# Patient Record
Sex: MALE | Race: WHITE | NOT HISPANIC OR LATINO | Employment: UNEMPLOYED | URBAN - METROPOLITAN AREA
[De-identification: names, ages, dates, MRNs, and addresses within clinical notes are randomized per-mention and may not be internally consistent; named-entity substitution may affect disease eponyms.]

---

## 2019-06-01 ENCOUNTER — APPOINTMENT (OUTPATIENT)
Dept: RADIOLOGY | Facility: MEDICAL CENTER | Age: 24
DRG: 199 | End: 2019-06-01
Attending: SURGERY
Payer: MEDICAID

## 2019-06-01 ENCOUNTER — APPOINTMENT (OUTPATIENT)
Dept: RADIOLOGY | Facility: MEDICAL CENTER | Age: 24
DRG: 199 | End: 2019-06-01
Attending: EMERGENCY MEDICINE
Payer: MEDICAID

## 2019-06-01 ENCOUNTER — HOSPITAL ENCOUNTER (INPATIENT)
Facility: MEDICAL CENTER | Age: 24
LOS: 6 days | DRG: 199 | End: 2019-06-07
Attending: EMERGENCY MEDICINE | Admitting: SURGERY
Payer: MEDICAID

## 2019-06-01 DIAGNOSIS — T14.90XA TRAUMA: ICD-10-CM

## 2019-06-01 DIAGNOSIS — T07.XXXA MULTIPLE STAB WOUNDS: ICD-10-CM

## 2019-06-01 DIAGNOSIS — J93.9 PNEUMOTHORAX, UNSPECIFIED TYPE: ICD-10-CM

## 2019-06-01 DIAGNOSIS — T14.8XXA STAB WOUND: ICD-10-CM

## 2019-06-01 DIAGNOSIS — J94.2 HEMOPNEUMOTHORAX ON LEFT: ICD-10-CM

## 2019-06-01 PROBLEM — Z78.9 NO CONTRAINDICATION TO DEEP VEIN THROMBOSIS (DVT) PROPHYLAXIS: Status: ACTIVE | Noted: 2019-06-01

## 2019-06-01 LAB
ABO + RH BLD: NORMAL
ABO GROUP BLD: NORMAL
ALBUMIN SERPL BCP-MCNC: 3.7 G/DL (ref 3.2–4.9)
ALBUMIN/GLOB SERPL: 1.7 G/DL
ALP SERPL-CCNC: 40 U/L (ref 30–99)
ALT SERPL-CCNC: 45 U/L (ref 2–50)
ANION GAP SERPL CALC-SCNC: 9 MMOL/L (ref 0–11.9)
APTT PPP: 26.8 SEC (ref 24.7–36)
AST SERPL-CCNC: 29 U/L (ref 12–45)
BARCODED ABORH UBTYP: 5100
BARCODED ABORH UBTYP: 600
BARCODED ABORH UBTYP: 6200
BARCODED PRD CODE UBPRD: NORMAL
BARCODED UNIT NUM UBUNT: NORMAL
BASOPHILS # BLD AUTO: 0.3 % (ref 0–1.8)
BASOPHILS # BLD: 0.05 K/UL (ref 0–0.12)
BILIRUB SERPL-MCNC: 0.6 MG/DL (ref 0.1–1.5)
BLD GP AB SCN SERPL QL: NORMAL
BUN SERPL-MCNC: 8 MG/DL (ref 8–22)
CALCIUM SERPL-MCNC: 7.6 MG/DL (ref 8.5–10.5)
CFT BLD TEG: 3.2 MIN (ref 5–10)
CHLORIDE SERPL-SCNC: 108 MMOL/L (ref 96–112)
CLOT ANGLE BLD TEG: 68.4 DEGREES (ref 53–72)
CLOT LYSIS 30M P MA LENFR BLD TEG: 0.2 % (ref 0–8)
CO2 SERPL-SCNC: 21 MMOL/L (ref 20–33)
COMPONENT F 8504F: NORMAL
COMPONENT P 8504P: NORMAL
COMPONENT R 8504R: NORMAL
CREAT SERPL-MCNC: 0.77 MG/DL (ref 0.5–1.4)
CT.EXTRINSIC BLD ROTEM: 1.6 MIN (ref 1–3)
EOSINOPHIL # BLD AUTO: 0.01 K/UL (ref 0–0.51)
EOSINOPHIL NFR BLD: 0.1 % (ref 0–6.9)
ERYTHROCYTE [DISTWIDTH] IN BLOOD BY AUTOMATED COUNT: 40.6 FL (ref 35.9–50)
ERYTHROCYTE [DISTWIDTH] IN BLOOD BY AUTOMATED COUNT: 41.7 FL (ref 35.9–50)
ETHANOL BLD-MCNC: 0 G/DL
GLOBULIN SER CALC-MCNC: 2.2 G/DL (ref 1.9–3.5)
GLUCOSE SERPL-MCNC: 155 MG/DL (ref 65–99)
HCT VFR BLD AUTO: 33.4 % (ref 42–52)
HCT VFR BLD AUTO: 37.4 % (ref 42–52)
HGB BLD-MCNC: 11.3 G/DL (ref 14–18)
HGB BLD-MCNC: 12.4 G/DL (ref 14–18)
IMM GRANULOCYTES # BLD AUTO: 0.05 K/UL (ref 0–0.11)
IMM GRANULOCYTES NFR BLD AUTO: 0.3 % (ref 0–0.9)
INR PPP: 1.11 (ref 0.87–1.13)
LACTATE BLD-SCNC: 1.5 MMOL/L (ref 0.5–2)
LYMPHOCYTES # BLD AUTO: 1.18 K/UL (ref 1–4.8)
LYMPHOCYTES NFR BLD: 6.4 % (ref 22–41)
MAGNESIUM SERPL-MCNC: 1.5 MG/DL (ref 1.5–2.5)
MCF BLD TEG: 65.3 MM (ref 50–70)
MCH RBC QN AUTO: 30.6 PG (ref 27–33)
MCH RBC QN AUTO: 30.8 PG (ref 27–33)
MCHC RBC AUTO-ENTMCNC: 33.2 G/DL (ref 33.7–35.3)
MCHC RBC AUTO-ENTMCNC: 33.8 G/DL (ref 33.7–35.3)
MCV RBC AUTO: 90.5 FL (ref 81.4–97.8)
MCV RBC AUTO: 92.8 FL (ref 81.4–97.8)
MONOCYTES # BLD AUTO: 1.02 K/UL (ref 0–0.85)
MONOCYTES NFR BLD AUTO: 5.6 % (ref 0–13.4)
NEUTROPHILS # BLD AUTO: 16 K/UL (ref 1.82–7.42)
NEUTROPHILS NFR BLD: 87.3 % (ref 44–72)
NRBC # BLD AUTO: 0 K/UL
NRBC BLD-RTO: 0 /100 WBC
PA AA BLD-ACNC: 0 %
PA ADP BLD-ACNC: 31.5 %
PHOSPHATE SERPL-MCNC: 4 MG/DL (ref 2.5–4.5)
PLATELET # BLD AUTO: 250 K/UL (ref 164–446)
PLATELET # BLD AUTO: 293 K/UL (ref 164–446)
PMV BLD AUTO: 10.7 FL (ref 9–12.9)
PMV BLD AUTO: 10.7 FL (ref 9–12.9)
POTASSIUM SERPL-SCNC: 3.5 MMOL/L (ref 3.6–5.5)
PRODUCT TYPE UPROD: NORMAL
PROT SERPL-MCNC: 5.9 G/DL (ref 6–8.2)
PROTHROMBIN TIME: 14.6 SEC (ref 12–14.6)
RBC # BLD AUTO: 3.69 M/UL (ref 4.7–6.1)
RBC # BLD AUTO: 4.03 M/UL (ref 4.7–6.1)
RH BLD: NORMAL
SODIUM SERPL-SCNC: 138 MMOL/L (ref 135–145)
TEG ALGORITHM TGALG: ABNORMAL
UNIT STATUS USTAT: NORMAL
WBC # BLD AUTO: 18.3 K/UL (ref 4.8–10.8)
WBC # BLD AUTO: 9.7 K/UL (ref 4.8–10.8)

## 2019-06-01 PROCEDURE — 96375 TX/PRO/DX INJ NEW DRUG ADDON: CPT

## 2019-06-01 PROCEDURE — 80307 DRUG TEST PRSMV CHEM ANLYZR: CPT

## 2019-06-01 PROCEDURE — 700101 HCHG RX REV CODE 250: Performed by: SURGERY

## 2019-06-01 PROCEDURE — 85027 COMPLETE CBC AUTOMATED: CPT

## 2019-06-01 PROCEDURE — 36415 COLL VENOUS BLD VENIPUNCTURE: CPT

## 2019-06-01 PROCEDURE — 302128 INFUSION PUMP: Performed by: SURGERY

## 2019-06-01 PROCEDURE — 302131 K PAD MOTOR: Performed by: SURGERY

## 2019-06-01 PROCEDURE — 83735 ASSAY OF MAGNESIUM: CPT

## 2019-06-01 PROCEDURE — 80053 COMPREHEN METABOLIC PANEL: CPT

## 2019-06-01 PROCEDURE — 71045 X-RAY EXAM CHEST 1 VIEW: CPT

## 2019-06-01 PROCEDURE — 700117 HCHG RX CONTRAST REV CODE 255: Performed by: EMERGENCY MEDICINE

## 2019-06-01 PROCEDURE — 86850 RBC ANTIBODY SCREEN: CPT

## 2019-06-01 PROCEDURE — 0HQ6XZZ REPAIR BACK SKIN, EXTERNAL APPROACH: ICD-10-PCS | Performed by: SURGERY

## 2019-06-01 PROCEDURE — 700111 HCHG RX REV CODE 636 W/ 250 OVERRIDE (IP)

## 2019-06-01 PROCEDURE — 71260 CT THORAX DX C+: CPT

## 2019-06-01 PROCEDURE — 700101 HCHG RX REV CODE 250

## 2019-06-01 PROCEDURE — 0W9B30Z DRAINAGE OF LEFT PLEURAL CAVITY WITH DRAINAGE DEVICE, PERCUTANEOUS APPROACH: ICD-10-PCS | Performed by: INTERNAL MEDICINE

## 2019-06-01 PROCEDURE — 0HQGXZZ REPAIR LEFT HAND SKIN, EXTERNAL APPROACH: ICD-10-PCS | Performed by: SURGERY

## 2019-06-01 PROCEDURE — 96365 THER/PROPH/DIAG IV INF INIT: CPT

## 2019-06-01 PROCEDURE — 700111 HCHG RX REV CODE 636 W/ 250 OVERRIDE (IP): Performed by: SURGERY

## 2019-06-01 PROCEDURE — 99285 EMERGENCY DEPT VISIT HI MDM: CPT

## 2019-06-01 PROCEDURE — 770006 HCHG ROOM/CARE - MED/SURG/GYN SEMI*

## 2019-06-01 PROCEDURE — 94668 MNPJ CHEST WALL SBSQ: CPT

## 2019-06-01 PROCEDURE — 700105 HCHG RX REV CODE 258: Performed by: SURGERY

## 2019-06-01 PROCEDURE — 32551 INSERTION OF CHEST TUBE: CPT

## 2019-06-01 PROCEDURE — 302220 CHEST TUBE TRAY: Performed by: SURGERY

## 2019-06-01 PROCEDURE — 700112 HCHG RX REV CODE 229: Performed by: SURGERY

## 2019-06-01 PROCEDURE — 94760 N-INVAS EAR/PLS OXIMETRY 1: CPT

## 2019-06-01 PROCEDURE — 84100 ASSAY OF PHOSPHORUS: CPT

## 2019-06-01 PROCEDURE — 700102 HCHG RX REV CODE 250 W/ 637 OVERRIDE(OP): Performed by: SURGERY

## 2019-06-01 PROCEDURE — 94667 MNPJ CHEST WALL 1ST: CPT

## 2019-06-01 PROCEDURE — 0HQBXZZ REPAIR RIGHT UPPER ARM SKIN, EXTERNAL APPROACH: ICD-10-PCS | Performed by: SURGERY

## 2019-06-01 PROCEDURE — 304217 HCHG IRRIGATION SYSTEM

## 2019-06-01 PROCEDURE — 85730 THROMBOPLASTIN TIME PARTIAL: CPT

## 2019-06-01 PROCEDURE — 86901 BLOOD TYPING SEROLOGIC RH(D): CPT

## 2019-06-01 PROCEDURE — 305308 HCHG STAPLER,SKIN,DISP.

## 2019-06-01 PROCEDURE — A9270 NON-COVERED ITEM OR SERVICE: HCPCS | Performed by: SURGERY

## 2019-06-01 PROCEDURE — 83605 ASSAY OF LACTIC ACID: CPT

## 2019-06-01 PROCEDURE — 85025 COMPLETE CBC W/AUTO DIFF WBC: CPT

## 2019-06-01 PROCEDURE — 305949 HCHG RED TRAUMA ACT PRE-NOTIFY NO CC

## 2019-06-01 PROCEDURE — 0HQHXZZ REPAIR RIGHT UPPER LEG SKIN, EXTERNAL APPROACH: ICD-10-PCS | Performed by: SURGERY

## 2019-06-01 PROCEDURE — 85347 COAGULATION TIME ACTIVATED: CPT

## 2019-06-01 PROCEDURE — 304999 HCHG REPAIR-SIMPLE/INTERMED LEVEL 1

## 2019-06-01 PROCEDURE — 85384 FIBRINOGEN ACTIVITY: CPT

## 2019-06-01 PROCEDURE — 85610 PROTHROMBIN TIME: CPT

## 2019-06-01 PROCEDURE — 86900 BLOOD TYPING SEROLOGIC ABO: CPT

## 2019-06-01 PROCEDURE — 0HQ5XZZ REPAIR CHEST SKIN, EXTERNAL APPROACH: ICD-10-PCS | Performed by: SURGERY

## 2019-06-01 PROCEDURE — 303747 HCHG EXTRA SUTURE

## 2019-06-01 PROCEDURE — 85576 BLOOD PLATELET AGGREGATION: CPT | Mod: 91

## 2019-06-01 PROCEDURE — 302151 K-PAD 14X20: Performed by: SURGERY

## 2019-06-01 RX ORDER — ONDANSETRON 2 MG/ML
4 INJECTION INTRAMUSCULAR; INTRAVENOUS EVERY 4 HOURS PRN
Status: DISCONTINUED | OUTPATIENT
Start: 2019-06-01 | End: 2019-06-07 | Stop reason: HOSPADM

## 2019-06-01 RX ORDER — ENEMA 19; 7 G/133ML; G/133ML
1 ENEMA RECTAL
Status: DISCONTINUED | OUTPATIENT
Start: 2019-06-01 | End: 2019-06-07 | Stop reason: HOSPADM

## 2019-06-01 RX ORDER — AMOXICILLIN 250 MG
1 CAPSULE ORAL
Status: DISCONTINUED | OUTPATIENT
Start: 2019-06-01 | End: 2019-06-07 | Stop reason: HOSPADM

## 2019-06-01 RX ORDER — HYDROMORPHONE HYDROCHLORIDE 1 MG/ML
0.5 INJECTION, SOLUTION INTRAMUSCULAR; INTRAVENOUS; SUBCUTANEOUS
Status: DISCONTINUED | OUTPATIENT
Start: 2019-06-01 | End: 2019-06-07 | Stop reason: HOSPADM

## 2019-06-01 RX ORDER — CEFAZOLIN SODIUM 1 G/50ML
2 INJECTION, SOLUTION INTRAVENOUS ONCE
Status: COMPLETED | OUTPATIENT
Start: 2019-06-01 | End: 2019-06-01

## 2019-06-01 RX ORDER — FAMOTIDINE 20 MG/1
20 TABLET, FILM COATED ORAL 2 TIMES DAILY
Status: DISCONTINUED | OUTPATIENT
Start: 2019-06-01 | End: 2019-06-07 | Stop reason: HOSPADM

## 2019-06-01 RX ORDER — LIDOCAINE HYDROCHLORIDE 10 MG/ML
20 INJECTION, SOLUTION INFILTRATION; PERINEURAL ONCE
Status: COMPLETED | OUTPATIENT
Start: 2019-06-01 | End: 2019-06-01

## 2019-06-01 RX ORDER — BACITRACIN ZINC AND POLYMYXIN B SULFATE 500; 1000 [USP'U]/G; [USP'U]/G
OINTMENT TOPICAL 3 TIMES DAILY
Status: DISCONTINUED | OUTPATIENT
Start: 2019-06-01 | End: 2019-06-07 | Stop reason: HOSPADM

## 2019-06-01 RX ORDER — DOCUSATE SODIUM 100 MG/1
100 CAPSULE, LIQUID FILLED ORAL 2 TIMES DAILY
Status: DISCONTINUED | OUTPATIENT
Start: 2019-06-01 | End: 2019-06-07 | Stop reason: HOSPADM

## 2019-06-01 RX ORDER — CELECOXIB 200 MG/1
200 CAPSULE ORAL 2 TIMES DAILY
Status: COMPLETED | OUTPATIENT
Start: 2019-06-01 | End: 2019-06-05

## 2019-06-01 RX ORDER — MIDAZOLAM HYDROCHLORIDE 1 MG/ML
1-5 INJECTION INTRAMUSCULAR; INTRAVENOUS ONCE
Status: COMPLETED | OUTPATIENT
Start: 2019-06-01 | End: 2019-06-01

## 2019-06-01 RX ORDER — BISACODYL 10 MG
10 SUPPOSITORY, RECTAL RECTAL
Status: DISCONTINUED | OUTPATIENT
Start: 2019-06-01 | End: 2019-06-07 | Stop reason: HOSPADM

## 2019-06-01 RX ORDER — SODIUM CHLORIDE, SODIUM LACTATE, POTASSIUM CHLORIDE, CALCIUM CHLORIDE 600; 310; 30; 20 MG/100ML; MG/100ML; MG/100ML; MG/100ML
INJECTION, SOLUTION INTRAVENOUS CONTINUOUS
Status: DISCONTINUED | OUTPATIENT
Start: 2019-06-01 | End: 2019-06-03

## 2019-06-01 RX ORDER — OXYCODONE HYDROCHLORIDE 5 MG/1
5 TABLET ORAL
Status: DISCONTINUED | OUTPATIENT
Start: 2019-06-01 | End: 2019-06-07 | Stop reason: HOSPADM

## 2019-06-01 RX ORDER — POLYETHYLENE GLYCOL 3350 17 G/17G
1 POWDER, FOR SOLUTION ORAL 2 TIMES DAILY
Status: DISCONTINUED | OUTPATIENT
Start: 2019-06-01 | End: 2019-06-07 | Stop reason: HOSPADM

## 2019-06-01 RX ORDER — LIDOCAINE HYDROCHLORIDE 10 MG/ML
INJECTION, SOLUTION INFILTRATION; PERINEURAL
Status: COMPLETED
Start: 2019-06-01 | End: 2019-06-01

## 2019-06-01 RX ORDER — AMOXICILLIN 250 MG
1 CAPSULE ORAL NIGHTLY
Status: DISCONTINUED | OUTPATIENT
Start: 2019-06-01 | End: 2019-06-07 | Stop reason: HOSPADM

## 2019-06-01 RX ORDER — ACETAMINOPHEN 325 MG/1
650 TABLET ORAL EVERY 6 HOURS
Status: DISPENSED | OUTPATIENT
Start: 2019-06-01 | End: 2019-06-05

## 2019-06-01 RX ADMIN — OXYCODONE HYDROCHLORIDE 5 MG: 5 TABLET ORAL at 22:05

## 2019-06-01 RX ADMIN — OXYCODONE HYDROCHLORIDE 5 MG: 5 TABLET ORAL at 11:22

## 2019-06-01 RX ADMIN — CEFAZOLIN SODIUM 1 G: 1 INJECTION, SOLUTION INTRAVENOUS at 01:15

## 2019-06-01 RX ADMIN — OXYCODONE HYDROCHLORIDE 5 MG: 5 TABLET ORAL at 07:21

## 2019-06-01 RX ADMIN — MIDAZOLAM HYDROCHLORIDE 5 MG: 1 INJECTION, SOLUTION INTRAMUSCULAR; INTRAVENOUS at 00:59

## 2019-06-01 RX ADMIN — ACETAMINOPHEN 650 MG: 325 TABLET, FILM COATED ORAL at 03:45

## 2019-06-01 RX ADMIN — OXYCODONE HYDROCHLORIDE 5 MG: 5 TABLET ORAL at 18:50

## 2019-06-01 RX ADMIN — ACETAMINOPHEN 650 MG: 325 TABLET, FILM COATED ORAL at 22:05

## 2019-06-01 RX ADMIN — FENTANYL CITRATE 100 MCG: 50 INJECTION INTRAMUSCULAR; INTRAVENOUS at 00:59

## 2019-06-01 RX ADMIN — HYDROMORPHONE HYDROCHLORIDE 0.5 MG: 1 INJECTION, SOLUTION INTRAMUSCULAR; INTRAVENOUS; SUBCUTANEOUS at 09:30

## 2019-06-01 RX ADMIN — HYDROMORPHONE HYDROCHLORIDE 0.5 MG: 1 INJECTION, SOLUTION INTRAMUSCULAR; INTRAVENOUS; SUBCUTANEOUS at 19:31

## 2019-06-01 RX ADMIN — LIDOCAINE HYDROCHLORIDE 20 ML: 10 INJECTION, SOLUTION INFILTRATION; PERINEURAL at 01:45

## 2019-06-01 RX ADMIN — CELECOXIB 200 MG: 200 CAPSULE ORAL at 03:44

## 2019-06-01 RX ADMIN — FAMOTIDINE 20 MG: 20 TABLET ORAL at 17:14

## 2019-06-01 RX ADMIN — Medication 1 EACH: at 03:49

## 2019-06-01 RX ADMIN — OXYCODONE HYDROCHLORIDE 5 MG: 5 TABLET ORAL at 15:31

## 2019-06-01 RX ADMIN — Medication 1 EACH: at 11:22

## 2019-06-01 RX ADMIN — SODIUM CHLORIDE, POTASSIUM CHLORIDE, SODIUM LACTATE AND CALCIUM CHLORIDE: 600; 310; 30; 20 INJECTION, SOLUTION INTRAVENOUS at 03:43

## 2019-06-01 RX ADMIN — SODIUM CHLORIDE, POTASSIUM CHLORIDE, SODIUM LACTATE AND CALCIUM CHLORIDE: 600; 310; 30; 20 INJECTION, SOLUTION INTRAVENOUS at 17:14

## 2019-06-01 RX ADMIN — ONDANSETRON 4 MG: 2 INJECTION INTRAMUSCULAR; INTRAVENOUS at 02:01

## 2019-06-01 RX ADMIN — FAMOTIDINE 20 MG: 20 TABLET ORAL at 03:44

## 2019-06-01 RX ADMIN — DOCUSATE SODIUM 100 MG: 100 CAPSULE, LIQUID FILLED ORAL at 17:14

## 2019-06-01 RX ADMIN — ACETAMINOPHEN 650 MG: 325 TABLET, FILM COATED ORAL at 11:22

## 2019-06-01 RX ADMIN — HYDROMORPHONE HYDROCHLORIDE 0.5 MG: 1 INJECTION, SOLUTION INTRAMUSCULAR; INTRAVENOUS; SUBCUTANEOUS at 02:01

## 2019-06-01 RX ADMIN — OXYCODONE HYDROCHLORIDE 5 MG: 5 TABLET ORAL at 03:44

## 2019-06-01 RX ADMIN — HYDROMORPHONE HYDROCHLORIDE 0.5 MG: 1 INJECTION, SOLUTION INTRAMUSCULAR; INTRAVENOUS; SUBCUTANEOUS at 13:39

## 2019-06-01 RX ADMIN — HYDROMORPHONE HYDROCHLORIDE 0.5 MG: 1 INJECTION, SOLUTION INTRAMUSCULAR; INTRAVENOUS; SUBCUTANEOUS at 05:56

## 2019-06-01 RX ADMIN — Medication 1 EACH: at 17:15

## 2019-06-01 RX ADMIN — IOHEXOL 100 ML: 350 INJECTION, SOLUTION INTRAVENOUS at 00:50

## 2019-06-01 RX ADMIN — ACETAMINOPHEN 650 MG: 325 TABLET, FILM COATED ORAL at 17:14

## 2019-06-01 RX ADMIN — ONDANSETRON 4 MG: 2 INJECTION INTRAMUSCULAR; INTRAVENOUS at 16:25

## 2019-06-01 RX ADMIN — CELECOXIB 200 MG: 200 CAPSULE ORAL at 17:14

## 2019-06-01 ASSESSMENT — ENCOUNTER SYMPTOMS
SPEECH CHANGE: 0
NAUSEA: 0
ABDOMINAL PAIN: 0
SHORTNESS OF BREATH: 0
MYALGIAS: 1
SENSORY CHANGE: 0
DOUBLE VISION: 0
FEVER: 0
FOCAL WEAKNESS: 0
CHILLS: 0

## 2019-06-01 ASSESSMENT — PATIENT HEALTH QUESTIONNAIRE - PHQ9
SUM OF ALL RESPONSES TO PHQ9 QUESTIONS 1 AND 2: 0
2. FEELING DOWN, DEPRESSED, IRRITABLE, OR HOPELESS: NOT AT ALL
1. LITTLE INTEREST OR PLEASURE IN DOING THINGS: NOT AT ALL

## 2019-06-01 ASSESSMENT — COGNITIVE AND FUNCTIONAL STATUS - GENERAL
EATING MEALS: A LITTLE
WALKING IN HOSPITAL ROOM: A LITTLE
PERSONAL GROOMING: A LITTLE
SUGGESTED CMS G CODE MODIFIER DAILY ACTIVITY: CK
STANDING UP FROM CHAIR USING ARMS: A LITTLE
DRESSING REGULAR UPPER BODY CLOTHING: A LITTLE
MOVING TO AND FROM BED TO CHAIR: A LITTLE
CLIMB 3 TO 5 STEPS WITH RAILING: A LITTLE
DAILY ACTIVITIY SCORE: 18
DRESSING REGULAR LOWER BODY CLOTHING: A LITTLE
MOVING FROM LYING ON BACK TO SITTING ON SIDE OF FLAT BED: A LITTLE
TOILETING: A LITTLE
MOBILITY SCORE: 18
HELP NEEDED FOR BATHING: A LITTLE
TURNING FROM BACK TO SIDE WHILE IN FLAT BAD: A LITTLE
SUGGESTED CMS G CODE MODIFIER MOBILITY: CK

## 2019-06-01 ASSESSMENT — LIFESTYLE VARIABLES
EVER_SMOKED: YES
EVER_SMOKED: YES
SUBSTANCE_ABUSE: 0
DO YOU DRINK ALCOHOL: NO

## 2019-06-01 ASSESSMENT — COPD QUESTIONNAIRES
DO YOU EVER COUGH UP ANY MUCUS OR PHLEGM?: NO/ONLY WITH OCCASIONAL COLDS OR INFECTIONS
COPD SCREENING SCORE: 2
HAVE YOU SMOKED AT LEAST 100 CIGARETTES IN YOUR ENTIRE LIFE: YES
DURING THE PAST 4 WEEKS HOW MUCH DID YOU FEEL SHORT OF BREATH: NONE/LITTLE OF THE TIME

## 2019-06-01 NOTE — ED NOTES
Pt taken to CT and [facility], remained on Zoll during transfer with ACLS RN. Report to Becky HUDDLESTON.

## 2019-06-01 NOTE — ED NOTES
Pt chest tube successfully placed, wounds sutured by MD. Pt resting at this time, placed in clean gown and linens. Updated on POC, call light in reach.
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
## 2019-06-01 NOTE — PROGRESS NOTES
"Assumed care of patient from RN at 0700.     Reviewed VS, labs, orders, and notes.     Pt sitting up in bed. A&Ox 4.    /83   Pulse 75   Temp 36.3 °C (97.3 °F) (Temporal)   Resp 16   Ht 1.676 m (5' 6\")   Wt 59 kg (130 lb)   SpO2 98%   BMI 20.98 kg/m² . MEWS Score: 0.     On 1 L oxygen. Denies SOB.  Encouraged use of IS.    Moves all extremities, denies numbness or tingling.     Skin assessed over bony prominences and under medical devices.       Hypoactive BS, denies flatus, LBM PTA.     Clear liquid diet, denies nausea/ vomiting.     Wound(s):   - Left thumb wound with sutures, ANDRZEJ.   -Right and left scapula wounds, staples. Right wound with sanguinous drainage gauze and tape placed.  -Right flank wound with staples. Dressing and pad under pt saturated with sanguinous drainage. Dressing (and bedding) changed.   - Left side of chest 2 wounds with staples, ANDRZEJ.    - Right back of thigh wound with staples ANDRZEJ.    Drain(s): left side chest tube to -20 cmH2o suction with serosanguinous output.     Patient reports 7 /10 pain at wound sites, previously medicated per MAR.     Pt. is 1 assist. Refusing to ambulate due to pain at this time. Patient educated on the need.    Fall prevention education reinforced with pt. Pt is wearing treaded slipper socks, IV pole is on the same side as the bathroom, lower bedrails are down.    Discussed POC, all questions answered at this time. Bed is locked and in the lowest position, call light within reach, Q 1 hour rounding in place. All needs met at this time.      "

## 2019-06-01 NOTE — PROCEDURES
Laceration Repair Procedure Note    Indication: Multiple lacerations    Procedure: The left back lacerations x 2 - 2 cm each, right inner thigh lacerations x2 - 2.5 cm and 1.5 cm, right iliac crest laceration 2 cm, right lateral chest laceration 2 cm and right posterior shoulder lacerations 3 cm   were irrigated with normal saline. The lacerations was closed with staples. Anesthesia around the left thumb laceration was obtained by infiltration using 2% Lidocaine without epinephrine. The area was then irrigated with normal saline. The laceration was closed with 3-0 silk using interrupted sutures.    Patient seen, data reviewed and discussed.  Agree with assessment treatment and plan.  HEMA
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
## 2019-06-01 NOTE — ED NOTES
PT given meds per mar.  Tolerated well, resting at this time. siderails up x2, call light within reach.   Pt alert, oriented but restless reporting severe pain this time, medicated per MAR PRN pain protocol.

## 2019-06-01 NOTE — PROGRESS NOTES
"TRAUMA TERTIARY SURVEY     Mental status adequate for full examination?: Yes    Spine cleared (radiologically and/or clinically): Yes    PHYSICAL EXAMINATION:  Vitals: /83   Pulse 75   Temp 36.3 °C (97.3 °F) (Temporal)   Resp 16   Ht 1.676 m (5' 6\")   Wt 59 kg (130 lb)   SpO2 98%   BMI 20.98 kg/m²   Constitutional:     General Appearance: appears stated age.  HEENT:     No significant external craniofacial trauma. The pupils are equal, round, and reactive to light bilaterally.   The midface and jaw are stable. No malocclusion is evident.  Neck:    The cervical spine is supple and non tender.  Respiratory:   Inspection: Unlabored respirations, no intercostal retractions, paradoxical motion, or accessory muscle use.   Palpation:  The chest is tender - left chest wall. The clavicles are non deformed bilaterally.   Auscultation: clear to auscultation.  Cardiovascular:   Auscultation: normal.   Peripheral Pulses: Normal.   Abdomen:   Abdomen is soft, nontender, without organomegaly or masses.    Musculoskeletal:   The pelvis is stable.  No significant angulation, deformity, or soft tissue injury involving the upper and lower extremities. Normal range of motion.   Back:   The thoracolumbar spine was examined. Examination is remarkable for no significant tenderness, swelling, or deformity in the thoracolumbar region.  Skin:   The skin is warm and dry.  Neurologic:    Ribera Coma Scale (GCS) 4 [id number]. Neurologic examination revealed no focal deficits noted.  Psychiatric:   The patient does not appear depressed or anxious.    IMAGING:  DX-CHEST-PORTABLE (1 VIEW)   Final Result      Reexpansion of previous left pneumothorax, subsequent chest tube placement.      CT-CHEST,ABDOMEN,PELVIS WITH   Final Result      1.  Left hydropneumothorax without tension. Despite this, no definite rib fracture is identified. Unremarkable chest scan otherwise.   2.  Normal CT scans of the abdomen and pelvis.      DX-CHEST-LIMITED " (1 VIEW)   Final Result      Left pneumothorax as noted above.      These findings were discussed with LEIGH ANN GRIJALVA on 6/1/2019 at 0054 hours.               INTERPRETING LOCATION: [address] [place] [address], [facility]      US-ABORTED US PROCEDURE    (Results Pending)     All current laboratory studies/radiology exams reviewed: Yes    Completed Consultations:  none     Pending Consultations:  none    Newly Identified Diagnoses and Injuries:  No further findings
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
## 2019-06-01 NOTE — CARE PLAN
Problem: Communication  Goal: The ability to communicate needs accurately and effectively will improve  Outcome: PROGRESSING AS EXPECTED  Pt communicates appropriately and is able to express his needs with staff    Problem: Safety  Goal: Will remain free from injury  Outcome: PROGRESSING AS EXPECTED  Pt does not exit the bed without staff present and calls appropraitely
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
## 2019-06-01 NOTE — CARE PLAN
Problem: Safety  Goal: Will remain free from falls  Outcome: PROGRESSING AS EXPECTED  Discussed fall prevention at start of shift. Pt verbalized understanding.     Problem: Skin Integrity  Goal: Risk for impaired skin integrity will decrease  Outcome: PROGRESSING AS EXPECTED  Discussed need to change position in bed min of Q 2 hrs and ambulate min of 3X daily.
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
## 2019-06-01 NOTE — CARE PLAN
Problem: Hyperinflation:  Goal: Prevent or improve atelectasis    Intervention: Instruct incentive spirometry usage  PEP, chest tube on left.

## 2019-06-02 ENCOUNTER — APPOINTMENT (OUTPATIENT)
Dept: RADIOLOGY | Facility: MEDICAL CENTER | Age: 24
DRG: 199 | End: 2019-06-02
Attending: SURGERY
Payer: MEDICAID

## 2019-06-02 ENCOUNTER — APPOINTMENT (OUTPATIENT)
Dept: RADIOLOGY | Facility: MEDICAL CENTER | Age: 24
DRG: 199 | End: 2019-06-02
Attending: NURSE PRACTITIONER
Payer: MEDICAID

## 2019-06-02 LAB
ALBUMIN SERPL BCP-MCNC: 3.9 G/DL (ref 3.2–4.9)
ALBUMIN/GLOB SERPL: 1.9 G/DL
ALP SERPL-CCNC: 38 U/L (ref 30–99)
ALT SERPL-CCNC: 38 U/L (ref 2–50)
ANION GAP SERPL CALC-SCNC: 7 MMOL/L (ref 0–11.9)
AST SERPL-CCNC: 24 U/L (ref 12–45)
BASOPHILS # BLD AUTO: 0.5 % (ref 0–1.8)
BASOPHILS # BLD: 0.05 K/UL (ref 0–0.12)
BILIRUB SERPL-MCNC: 0.9 MG/DL (ref 0.1–1.5)
BUN SERPL-MCNC: 8 MG/DL (ref 8–22)
CALCIUM SERPL-MCNC: 8.7 MG/DL (ref 8.5–10.5)
CHLORIDE SERPL-SCNC: 106 MMOL/L (ref 96–112)
CO2 SERPL-SCNC: 26 MMOL/L (ref 20–33)
CREAT SERPL-MCNC: 0.69 MG/DL (ref 0.5–1.4)
EOSINOPHIL # BLD AUTO: 0 K/UL (ref 0–0.51)
EOSINOPHIL NFR BLD: 0 % (ref 0–6.9)
ERYTHROCYTE [DISTWIDTH] IN BLOOD BY AUTOMATED COUNT: 39.9 FL (ref 35.9–50)
GLOBULIN SER CALC-MCNC: 2.1 G/DL (ref 1.9–3.5)
GLUCOSE SERPL-MCNC: 108 MG/DL (ref 65–99)
HCT VFR BLD AUTO: 30.9 % (ref 42–52)
HGB BLD-MCNC: 10.5 G/DL (ref 14–18)
IMM GRANULOCYTES # BLD AUTO: 0.04 K/UL (ref 0–0.11)
IMM GRANULOCYTES NFR BLD AUTO: 0.4 % (ref 0–0.9)
LYMPHOCYTES # BLD AUTO: 1.48 K/UL (ref 1–4.8)
LYMPHOCYTES NFR BLD: 14.3 % (ref 22–41)
MCH RBC QN AUTO: 30.4 PG (ref 27–33)
MCHC RBC AUTO-ENTMCNC: 34 G/DL (ref 33.7–35.3)
MCV RBC AUTO: 89.6 FL (ref 81.4–97.8)
MONOCYTES # BLD AUTO: 0.9 K/UL (ref 0–0.85)
MONOCYTES NFR BLD AUTO: 8.7 % (ref 0–13.4)
NEUTROPHILS # BLD AUTO: 7.85 K/UL (ref 1.82–7.42)
NEUTROPHILS NFR BLD: 76.1 % (ref 44–72)
NRBC # BLD AUTO: 0 K/UL
NRBC BLD-RTO: 0 /100 WBC
PLATELET # BLD AUTO: 223 K/UL (ref 164–446)
PMV BLD AUTO: 10.5 FL (ref 9–12.9)
POTASSIUM SERPL-SCNC: 3.8 MMOL/L (ref 3.6–5.5)
PROT SERPL-MCNC: 6 G/DL (ref 6–8.2)
RBC # BLD AUTO: 3.45 M/UL (ref 4.7–6.1)
SODIUM SERPL-SCNC: 139 MMOL/L (ref 135–145)
WBC # BLD AUTO: 10.3 K/UL (ref 4.8–10.8)

## 2019-06-02 PROCEDURE — 85025 COMPLETE CBC W/AUTO DIFF WBC: CPT

## 2019-06-02 PROCEDURE — 770006 HCHG ROOM/CARE - MED/SURG/GYN SEMI*

## 2019-06-02 PROCEDURE — 71045 X-RAY EXAM CHEST 1 VIEW: CPT

## 2019-06-02 PROCEDURE — 700101 HCHG RX REV CODE 250: Performed by: SURGERY

## 2019-06-02 PROCEDURE — C1729 CATH, DRAINAGE: HCPCS | Performed by: SURGERY

## 2019-06-02 PROCEDURE — A9270 NON-COVERED ITEM OR SERVICE: HCPCS | Performed by: SURGERY

## 2019-06-02 PROCEDURE — 80053 COMPREHEN METABOLIC PANEL: CPT

## 2019-06-02 PROCEDURE — 700105 HCHG RX REV CODE 258: Performed by: SURGERY

## 2019-06-02 PROCEDURE — 36415 COLL VENOUS BLD VENIPUNCTURE: CPT

## 2019-06-02 PROCEDURE — 700112 HCHG RX REV CODE 229: Performed by: SURGERY

## 2019-06-02 PROCEDURE — 700102 HCHG RX REV CODE 250 W/ 637 OVERRIDE(OP): Performed by: SURGERY

## 2019-06-02 PROCEDURE — 700111 HCHG RX REV CODE 636 W/ 250 OVERRIDE (IP): Performed by: SURGERY

## 2019-06-02 RX ADMIN — ENOXAPARIN SODIUM 30 MG: 100 INJECTION SUBCUTANEOUS at 04:29

## 2019-06-02 RX ADMIN — OXYCODONE HYDROCHLORIDE 5 MG: 5 TABLET ORAL at 04:29

## 2019-06-02 RX ADMIN — OXYCODONE HYDROCHLORIDE 5 MG: 5 TABLET ORAL at 15:46

## 2019-06-02 RX ADMIN — ACETAMINOPHEN 650 MG: 325 TABLET, FILM COATED ORAL at 18:18

## 2019-06-02 RX ADMIN — ACETAMINOPHEN 650 MG: 325 TABLET, FILM COATED ORAL at 23:05

## 2019-06-02 RX ADMIN — ENOXAPARIN SODIUM 30 MG: 100 INJECTION SUBCUTANEOUS at 18:18

## 2019-06-02 RX ADMIN — DOCUSATE SODIUM 100 MG: 100 CAPSULE, LIQUID FILLED ORAL at 18:19

## 2019-06-02 RX ADMIN — POLYETHYLENE GLYCOL 3350 1 PACKET: 17 POWDER, FOR SOLUTION ORAL at 18:19

## 2019-06-02 RX ADMIN — FAMOTIDINE 20 MG: 20 TABLET ORAL at 04:29

## 2019-06-02 RX ADMIN — OXYCODONE HYDROCHLORIDE 5 MG: 5 TABLET ORAL at 19:40

## 2019-06-02 RX ADMIN — Medication 1 EACH: at 04:29

## 2019-06-02 RX ADMIN — CELECOXIB 200 MG: 200 CAPSULE ORAL at 04:29

## 2019-06-02 RX ADMIN — ACETAMINOPHEN 650 MG: 325 TABLET, FILM COATED ORAL at 14:04

## 2019-06-02 RX ADMIN — HYDROMORPHONE HYDROCHLORIDE 0.5 MG: 1 INJECTION, SOLUTION INTRAMUSCULAR; INTRAVENOUS; SUBCUTANEOUS at 19:44

## 2019-06-02 RX ADMIN — OXYCODONE HYDROCHLORIDE 5 MG: 5 TABLET ORAL at 23:05

## 2019-06-02 RX ADMIN — ACETAMINOPHEN 650 MG: 325 TABLET, FILM COATED ORAL at 04:29

## 2019-06-02 RX ADMIN — CELECOXIB 200 MG: 200 CAPSULE ORAL at 18:18

## 2019-06-02 RX ADMIN — SODIUM CHLORIDE, POTASSIUM CHLORIDE, SODIUM LACTATE AND CALCIUM CHLORIDE: 600; 310; 30; 20 INJECTION, SOLUTION INTRAVENOUS at 04:36

## 2019-06-02 RX ADMIN — OXYCODONE HYDROCHLORIDE 5 MG: 5 TABLET ORAL at 08:50

## 2019-06-02 RX ADMIN — SENNOSIDES,DOCUSATE SODIUM 1 TABLET: 8.6; 5 TABLET, FILM COATED ORAL at 19:40

## 2019-06-02 RX ADMIN — HYDROMORPHONE HYDROCHLORIDE 0.5 MG: 1 INJECTION, SOLUTION INTRAMUSCULAR; INTRAVENOUS; SUBCUTANEOUS at 21:27

## 2019-06-02 RX ADMIN — FAMOTIDINE 20 MG: 20 TABLET ORAL at 18:19

## 2019-06-02 ASSESSMENT — LIFESTYLE VARIABLES: SUBSTANCE_ABUSE: 0

## 2019-06-02 ASSESSMENT — ENCOUNTER SYMPTOMS
SHORTNESS OF BREATH: 0
ABDOMINAL PAIN: 0
MYALGIAS: 1
SENSORY CHANGE: 0
SPEECH CHANGE: 0
DOUBLE VISION: 0
FEVER: 0
NAUSEA: 0
FOCAL WEAKNESS: 0

## 2019-06-02 NOTE — CARE PLAN
Problem: Knowledge Deficit  Goal: Knowledge of disease process/condition, treatment plan, diagnostic tests, and medications will improve  Outcome: PROGRESSING AS EXPECTED  Pt reports understanding of plan of care and has no questions at this time  Goal: Knowledge of the prescribed therapeutic regimen will improve  Outcome: PROGRESSING AS EXPECTED  Pt reports understanding of plan of care and has no questions

## 2019-06-02 NOTE — CARE PLAN
Problem: Venous Thromboembolism (VTW)/Deep Vein Thrombosis (DVT) Prevention:  Goal: Patient will participate in Venous Thrombosis (VTE)/Deep Vein Thrombosis (DVT)Prevention Measures  Pt wearing SCD's when in bed    Problem: Pain Management  Goal: Pain level will decrease to patient's comfort goal  Outcome: PROGRESSING AS EXPECTED  Consistent pain scale used.

## 2019-06-02 NOTE — PROGRESS NOTES
"Assumed care of patient from RN at 0700.      Reviewed VS, labs, orders, and notes.      Pt sitting up in bed. A&Ox 4.     /78   Pulse 82   Temp 36.8 °C (98.3 °F) (Temporal)   Resp 16   Ht 1.676 m (5' 6\")   Wt 59 kg (130 lb)   SpO2 97%   BMI 20.98 kg/m² MEWS Score: 1.      On room air. Denies SOB.  Encouraged use of IS.     Moves all extremities, denies numbness or tingling.      Skin assessed over bony prominences and under medical devices.      Hyperactive BS, denies flatus, LBM PTA.      Diet advanced to Low fiber GI soft, denies nausea/ vomiting.      Wound(s):   - Left thumb wound with sutures, ANDRZEJ.   -Right and left scapula wounds, staples. Right scapular with gauze and tape with old drainage.  -Right flank wound with staples. Dressing CDI.  - Left side of chest 2 wounds with staples, ANDRZEJ.    - Right back of thigh wound with staples ANDRZEJ.     Drain(s): left side chest tube place to water seal per order.     Patient reports 8 /10 pain at wound sites, medicated per MAR.      Pt. is SBA with steady gait.     Fall prevention education reinforced with pt. Pt is wearing treaded slipper socks, IV pole is on the same side as the bathroom, lower bedrails are down.     Discussed POC, all questions answered at this time. Bed is locked and in the lowest position, call light within reach, Q 1 hour rounding in place. All needs met at this time.                                "
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
## 2019-06-03 ENCOUNTER — APPOINTMENT (OUTPATIENT)
Dept: RADIOLOGY | Facility: MEDICAL CENTER | Age: 24
DRG: 199 | End: 2019-06-03
Attending: SURGERY
Payer: MEDICAID

## 2019-06-03 LAB
ALBUMIN SERPL BCP-MCNC: 4 G/DL (ref 3.2–4.9)
ALBUMIN/GLOB SERPL: 1.7 G/DL
ALP SERPL-CCNC: 37 U/L (ref 30–99)
ALT SERPL-CCNC: 33 U/L (ref 2–50)
ANION GAP SERPL CALC-SCNC: 9 MMOL/L (ref 0–11.9)
AST SERPL-CCNC: 22 U/L (ref 12–45)
BASOPHILS # BLD AUTO: 0.8 % (ref 0–1.8)
BASOPHILS # BLD: 0.06 K/UL (ref 0–0.12)
BILIRUB SERPL-MCNC: 0.5 MG/DL (ref 0.1–1.5)
BUN SERPL-MCNC: 10 MG/DL (ref 8–22)
CALCIUM SERPL-MCNC: 8.9 MG/DL (ref 8.5–10.5)
CHLORIDE SERPL-SCNC: 103 MMOL/L (ref 96–112)
CO2 SERPL-SCNC: 27 MMOL/L (ref 20–33)
CREAT SERPL-MCNC: 0.73 MG/DL (ref 0.5–1.4)
EOSINOPHIL # BLD AUTO: 0.07 K/UL (ref 0–0.51)
EOSINOPHIL NFR BLD: 0.9 % (ref 0–6.9)
ERYTHROCYTE [DISTWIDTH] IN BLOOD BY AUTOMATED COUNT: 40.5 FL (ref 35.9–50)
GLOBULIN SER CALC-MCNC: 2.3 G/DL (ref 1.9–3.5)
GLUCOSE SERPL-MCNC: 94 MG/DL (ref 65–99)
HCT VFR BLD AUTO: 29 % (ref 42–52)
HGB BLD-MCNC: 9.9 G/DL (ref 14–18)
IMM GRANULOCYTES # BLD AUTO: 0.03 K/UL (ref 0–0.11)
IMM GRANULOCYTES NFR BLD AUTO: 0.4 % (ref 0–0.9)
LYMPHOCYTES # BLD AUTO: 2.02 K/UL (ref 1–4.8)
LYMPHOCYTES NFR BLD: 25.3 % (ref 22–41)
MAGNESIUM SERPL-MCNC: 2 MG/DL (ref 1.5–2.5)
MCH RBC QN AUTO: 31.1 PG (ref 27–33)
MCHC RBC AUTO-ENTMCNC: 34.1 G/DL (ref 33.7–35.3)
MCV RBC AUTO: 91.2 FL (ref 81.4–97.8)
MONOCYTES # BLD AUTO: 0.64 K/UL (ref 0–0.85)
MONOCYTES NFR BLD AUTO: 8 % (ref 0–13.4)
NEUTROPHILS # BLD AUTO: 5.16 K/UL (ref 1.82–7.42)
NEUTROPHILS NFR BLD: 64.6 % (ref 44–72)
NRBC # BLD AUTO: 0 K/UL
NRBC BLD-RTO: 0 /100 WBC
PHOSPHATE SERPL-MCNC: 4.2 MG/DL (ref 2.5–4.5)
PLATELET # BLD AUTO: 197 K/UL (ref 164–446)
PMV BLD AUTO: 10.9 FL (ref 9–12.9)
POTASSIUM SERPL-SCNC: 3.7 MMOL/L (ref 3.6–5.5)
PROT SERPL-MCNC: 6.3 G/DL (ref 6–8.2)
RBC # BLD AUTO: 3.18 M/UL (ref 4.7–6.1)
SODIUM SERPL-SCNC: 139 MMOL/L (ref 135–145)
WBC # BLD AUTO: 8 K/UL (ref 4.8–10.8)

## 2019-06-03 PROCEDURE — A9270 NON-COVERED ITEM OR SERVICE: HCPCS | Performed by: NURSE PRACTITIONER

## 2019-06-03 PROCEDURE — A9270 NON-COVERED ITEM OR SERVICE: HCPCS | Performed by: SURGERY

## 2019-06-03 PROCEDURE — 770006 HCHG ROOM/CARE - MED/SURG/GYN SEMI*

## 2019-06-03 PROCEDURE — 700112 HCHG RX REV CODE 229: Performed by: SURGERY

## 2019-06-03 PROCEDURE — 700102 HCHG RX REV CODE 250 W/ 637 OVERRIDE(OP): Performed by: SURGERY

## 2019-06-03 PROCEDURE — 700101 HCHG RX REV CODE 250: Performed by: SURGERY

## 2019-06-03 PROCEDURE — 84100 ASSAY OF PHOSPHORUS: CPT

## 2019-06-03 PROCEDURE — 80053 COMPREHEN METABOLIC PANEL: CPT

## 2019-06-03 PROCEDURE — 700111 HCHG RX REV CODE 636 W/ 250 OVERRIDE (IP): Performed by: SURGERY

## 2019-06-03 PROCEDURE — 83735 ASSAY OF MAGNESIUM: CPT

## 2019-06-03 PROCEDURE — 36415 COLL VENOUS BLD VENIPUNCTURE: CPT

## 2019-06-03 PROCEDURE — 85025 COMPLETE CBC W/AUTO DIFF WBC: CPT

## 2019-06-03 PROCEDURE — 71045 X-RAY EXAM CHEST 1 VIEW: CPT

## 2019-06-03 PROCEDURE — 700102 HCHG RX REV CODE 250 W/ 637 OVERRIDE(OP): Performed by: NURSE PRACTITIONER

## 2019-06-03 RX ORDER — METAXALONE 800 MG/1
800 TABLET ORAL 3 TIMES DAILY
Status: DISCONTINUED | OUTPATIENT
Start: 2019-06-03 | End: 2019-06-07 | Stop reason: HOSPADM

## 2019-06-03 RX ORDER — GABAPENTIN 300 MG/1
300 CAPSULE ORAL 3 TIMES DAILY
Status: DISCONTINUED | OUTPATIENT
Start: 2019-06-03 | End: 2019-06-07 | Stop reason: HOSPADM

## 2019-06-03 RX ADMIN — ACETAMINOPHEN 650 MG: 325 TABLET, FILM COATED ORAL at 11:40

## 2019-06-03 RX ADMIN — HYDROMORPHONE HYDROCHLORIDE 0.5 MG: 1 INJECTION, SOLUTION INTRAMUSCULAR; INTRAVENOUS; SUBCUTANEOUS at 08:02

## 2019-06-03 RX ADMIN — FAMOTIDINE 20 MG: 20 TABLET ORAL at 18:10

## 2019-06-03 RX ADMIN — OXYCODONE HYDROCHLORIDE 5 MG: 5 TABLET ORAL at 04:32

## 2019-06-03 RX ADMIN — DOCUSATE SODIUM 100 MG: 100 CAPSULE, LIQUID FILLED ORAL at 18:10

## 2019-06-03 RX ADMIN — OXYCODONE HYDROCHLORIDE 5 MG: 5 TABLET ORAL at 07:57

## 2019-06-03 RX ADMIN — GABAPENTIN 300 MG: 300 CAPSULE ORAL at 11:40

## 2019-06-03 RX ADMIN — FAMOTIDINE 20 MG: 20 TABLET ORAL at 06:30

## 2019-06-03 RX ADMIN — METAXALONE 800 MG: 800 TABLET ORAL at 12:00

## 2019-06-03 RX ADMIN — Medication 1 EACH: at 06:30

## 2019-06-03 RX ADMIN — CELECOXIB 200 MG: 200 CAPSULE ORAL at 18:10

## 2019-06-03 RX ADMIN — MAGNESIUM HYDROXIDE 30 ML: 400 SUSPENSION ORAL at 06:29

## 2019-06-03 RX ADMIN — ENOXAPARIN SODIUM 30 MG: 100 INJECTION SUBCUTANEOUS at 18:11

## 2019-06-03 RX ADMIN — POLYETHYLENE GLYCOL 3350 1 PACKET: 17 POWDER, FOR SOLUTION ORAL at 18:11

## 2019-06-03 RX ADMIN — ENOXAPARIN SODIUM 30 MG: 100 INJECTION SUBCUTANEOUS at 06:29

## 2019-06-03 RX ADMIN — POLYETHYLENE GLYCOL 3350 1 PACKET: 17 POWDER, FOR SOLUTION ORAL at 06:29

## 2019-06-03 RX ADMIN — OXYCODONE HYDROCHLORIDE 5 MG: 5 TABLET ORAL at 22:17

## 2019-06-03 RX ADMIN — Medication 1 EACH: at 18:10

## 2019-06-03 RX ADMIN — OXYCODONE HYDROCHLORIDE 5 MG: 5 TABLET ORAL at 11:40

## 2019-06-03 RX ADMIN — HYDROMORPHONE HYDROCHLORIDE 0.5 MG: 1 INJECTION, SOLUTION INTRAMUSCULAR; INTRAVENOUS; SUBCUTANEOUS at 10:34

## 2019-06-03 RX ADMIN — HYDROMORPHONE HYDROCHLORIDE 0.5 MG: 1 INJECTION, SOLUTION INTRAMUSCULAR; INTRAVENOUS; SUBCUTANEOUS at 20:53

## 2019-06-03 RX ADMIN — SENNOSIDES,DOCUSATE SODIUM 1 TABLET: 8.6; 5 TABLET, FILM COATED ORAL at 20:18

## 2019-06-03 RX ADMIN — ACETAMINOPHEN 650 MG: 325 TABLET, FILM COATED ORAL at 06:29

## 2019-06-03 RX ADMIN — DOCUSATE SODIUM 100 MG: 100 CAPSULE, LIQUID FILLED ORAL at 06:30

## 2019-06-03 RX ADMIN — OXYCODONE HYDROCHLORIDE 5 MG: 5 TABLET ORAL at 18:10

## 2019-06-03 RX ADMIN — Medication 1 EACH: at 11:41

## 2019-06-03 RX ADMIN — CELECOXIB 200 MG: 200 CAPSULE ORAL at 06:00

## 2019-06-03 RX ADMIN — ACETAMINOPHEN 650 MG: 325 TABLET, FILM COATED ORAL at 18:10

## 2019-06-03 RX ADMIN — GABAPENTIN 300 MG: 300 CAPSULE ORAL at 18:10

## 2019-06-03 RX ADMIN — METAXALONE 800 MG: 800 TABLET ORAL at 18:00

## 2019-06-03 RX ADMIN — HYDROMORPHONE HYDROCHLORIDE 0.5 MG: 1 INJECTION, SOLUTION INTRAMUSCULAR; INTRAVENOUS; SUBCUTANEOUS at 13:37

## 2019-06-03 ASSESSMENT — ENCOUNTER SYMPTOMS
DOUBLE VISION: 0
SPEECH CHANGE: 0
SHORTNESS OF BREATH: 0
NAUSEA: 0
ABDOMINAL PAIN: 0
FOCAL WEAKNESS: 0
SENSORY CHANGE: 0
ROS GI COMMENTS: LAST BOWEL MOVEMENT PTA
FEVER: 0
MYALGIAS: 1

## 2019-06-03 ASSESSMENT — LIFESTYLE VARIABLES: SUBSTANCE_ABUSE: 0
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
## 2019-06-03 NOTE — CARE PLAN
Problem: Safety  Goal: Will remain free from injury  Outcome: PROGRESSING AS EXPECTED  Safety precautions in place. Bed in locked/low position. 2 side rails up. Treaded socks. Call light in reach, calls appropriately. Hourly rounding practiced.    Problem: Pain Management  Goal: Pain level will decrease to patient's comfort goal  Outcome: PROGRESSING AS EXPECTED  Pain managed with PRN oxycodone. PRN IV Dilaudid available for breakthrough pain.

## 2019-06-03 NOTE — CARE PLAN
Problem: Pain Management  Goal: Pain level will decrease to patient's comfort goal  Outcome: PROGRESSING AS EXPECTED  Keep pain at or below patient comfort goal of 4 or less, administer pain medication as appropriate, use pillow to support chest during coughing and deep breathing.    Problem: Respiratory:  Goal: Respiratory status will improve  Outcome: PROGRESSING AS EXPECTED  Placed chest tube on suction, encourage use of incentive spirometer, using effectively, ambulation x3 in hallways, up to chair for meals.

## 2019-06-03 NOTE — PROGRESS NOTES
"  Patient was reporting \"throbbing and burning\" sensation around CT insertion site. Medicated per MAR. Dressing changed per protocol and site assessed (site intact). Patient denies SOB, merely reports pain around CT. WILLIAM Fox notified. Recommended ice/heat packs.   "
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
## 2019-06-04 ENCOUNTER — APPOINTMENT (OUTPATIENT)
Dept: RADIOLOGY | Facility: MEDICAL CENTER | Age: 24
DRG: 199 | End: 2019-06-04
Attending: SURGERY
Payer: MEDICAID

## 2019-06-04 LAB
ALBUMIN SERPL BCP-MCNC: 3.7 G/DL (ref 3.2–4.9)
ALBUMIN/GLOB SERPL: 1.5 G/DL
ALP SERPL-CCNC: 40 U/L (ref 30–99)
ALT SERPL-CCNC: 26 U/L (ref 2–50)
ANION GAP SERPL CALC-SCNC: 9 MMOL/L (ref 0–11.9)
AST SERPL-CCNC: 18 U/L (ref 12–45)
BASOPHILS # BLD AUTO: 1.2 % (ref 0–1.8)
BASOPHILS # BLD: 0.08 K/UL (ref 0–0.12)
BILIRUB SERPL-MCNC: 0.4 MG/DL (ref 0.1–1.5)
BUN SERPL-MCNC: 12 MG/DL (ref 8–22)
CALCIUM SERPL-MCNC: 8.8 MG/DL (ref 8.5–10.5)
CHLORIDE SERPL-SCNC: 102 MMOL/L (ref 96–112)
CO2 SERPL-SCNC: 28 MMOL/L (ref 20–33)
CREAT SERPL-MCNC: 0.71 MG/DL (ref 0.5–1.4)
EOSINOPHIL # BLD AUTO: 0.25 K/UL (ref 0–0.51)
EOSINOPHIL NFR BLD: 3.9 % (ref 0–6.9)
ERYTHROCYTE [DISTWIDTH] IN BLOOD BY AUTOMATED COUNT: 40.3 FL (ref 35.9–50)
GLOBULIN SER CALC-MCNC: 2.4 G/DL (ref 1.9–3.5)
GLUCOSE SERPL-MCNC: 100 MG/DL (ref 65–99)
HCT VFR BLD AUTO: 28.7 % (ref 42–52)
HGB BLD-MCNC: 9.5 G/DL (ref 14–18)
IMM GRANULOCYTES # BLD AUTO: 0.03 K/UL (ref 0–0.11)
IMM GRANULOCYTES NFR BLD AUTO: 0.5 % (ref 0–0.9)
LYMPHOCYTES # BLD AUTO: 1.73 K/UL (ref 1–4.8)
LYMPHOCYTES NFR BLD: 26.7 % (ref 22–41)
MCH RBC QN AUTO: 30.3 PG (ref 27–33)
MCHC RBC AUTO-ENTMCNC: 33.1 G/DL (ref 33.7–35.3)
MCV RBC AUTO: 91.4 FL (ref 81.4–97.8)
MONOCYTES # BLD AUTO: 0.6 K/UL (ref 0–0.85)
MONOCYTES NFR BLD AUTO: 9.3 % (ref 0–13.4)
NEUTROPHILS # BLD AUTO: 3.78 K/UL (ref 1.82–7.42)
NEUTROPHILS NFR BLD: 58.4 % (ref 44–72)
NRBC # BLD AUTO: 0 K/UL
NRBC BLD-RTO: 0 /100 WBC
PLATELET # BLD AUTO: 206 K/UL (ref 164–446)
PMV BLD AUTO: 11.3 FL (ref 9–12.9)
POTASSIUM SERPL-SCNC: 3.5 MMOL/L (ref 3.6–5.5)
PROT SERPL-MCNC: 6.1 G/DL (ref 6–8.2)
RBC # BLD AUTO: 3.14 M/UL (ref 4.7–6.1)
SODIUM SERPL-SCNC: 139 MMOL/L (ref 135–145)
WBC # BLD AUTO: 6.5 K/UL (ref 4.8–10.8)

## 2019-06-04 PROCEDURE — A9270 NON-COVERED ITEM OR SERVICE: HCPCS | Performed by: NURSE PRACTITIONER

## 2019-06-04 PROCEDURE — 700101 HCHG RX REV CODE 250: Performed by: SURGERY

## 2019-06-04 PROCEDURE — 71045 X-RAY EXAM CHEST 1 VIEW: CPT

## 2019-06-04 PROCEDURE — 700102 HCHG RX REV CODE 250 W/ 637 OVERRIDE(OP): Performed by: SURGERY

## 2019-06-04 PROCEDURE — 700111 HCHG RX REV CODE 636 W/ 250 OVERRIDE (IP): Performed by: SURGERY

## 2019-06-04 PROCEDURE — 770006 HCHG ROOM/CARE - MED/SURG/GYN SEMI*

## 2019-06-04 PROCEDURE — 700112 HCHG RX REV CODE 229: Performed by: SURGERY

## 2019-06-04 PROCEDURE — 700102 HCHG RX REV CODE 250 W/ 637 OVERRIDE(OP): Performed by: NURSE PRACTITIONER

## 2019-06-04 PROCEDURE — 302151 K-PAD 14X20: Performed by: SURGERY

## 2019-06-04 PROCEDURE — 36415 COLL VENOUS BLD VENIPUNCTURE: CPT

## 2019-06-04 PROCEDURE — A9270 NON-COVERED ITEM OR SERVICE: HCPCS | Performed by: SURGERY

## 2019-06-04 PROCEDURE — 80053 COMPREHEN METABOLIC PANEL: CPT

## 2019-06-04 PROCEDURE — 85025 COMPLETE CBC W/AUTO DIFF WBC: CPT

## 2019-06-04 RX ADMIN — GABAPENTIN 300 MG: 300 CAPSULE ORAL at 18:06

## 2019-06-04 RX ADMIN — METAXALONE 800 MG: 800 TABLET ORAL at 18:06

## 2019-06-04 RX ADMIN — OXYCODONE HYDROCHLORIDE 5 MG: 5 TABLET ORAL at 13:43

## 2019-06-04 RX ADMIN — CELECOXIB 200 MG: 200 CAPSULE ORAL at 18:06

## 2019-06-04 RX ADMIN — FAMOTIDINE 20 MG: 20 TABLET ORAL at 18:06

## 2019-06-04 RX ADMIN — OXYCODONE HYDROCHLORIDE 5 MG: 5 TABLET ORAL at 18:06

## 2019-06-04 RX ADMIN — Medication 1 EACH: at 12:27

## 2019-06-04 RX ADMIN — DOCUSATE SODIUM 100 MG: 100 CAPSULE, LIQUID FILLED ORAL at 18:06

## 2019-06-04 RX ADMIN — GABAPENTIN 300 MG: 300 CAPSULE ORAL at 12:26

## 2019-06-04 RX ADMIN — METAXALONE 800 MG: 800 TABLET ORAL at 04:49

## 2019-06-04 RX ADMIN — HYDROMORPHONE HYDROCHLORIDE 0.5 MG: 1 INJECTION, SOLUTION INTRAMUSCULAR; INTRAVENOUS; SUBCUTANEOUS at 00:03

## 2019-06-04 RX ADMIN — OXYCODONE HYDROCHLORIDE 5 MG: 5 TABLET ORAL at 21:22

## 2019-06-04 RX ADMIN — ENOXAPARIN SODIUM 30 MG: 100 INJECTION SUBCUTANEOUS at 18:07

## 2019-06-04 RX ADMIN — OXYCODONE HYDROCHLORIDE 5 MG: 5 TABLET ORAL at 01:55

## 2019-06-04 RX ADMIN — SENNOSIDES,DOCUSATE SODIUM 1 TABLET: 8.6; 5 TABLET, FILM COATED ORAL at 21:05

## 2019-06-04 RX ADMIN — FAMOTIDINE 20 MG: 20 TABLET ORAL at 04:50

## 2019-06-04 RX ADMIN — HYDROMORPHONE HYDROCHLORIDE 0.5 MG: 1 INJECTION, SOLUTION INTRAMUSCULAR; INTRAVENOUS; SUBCUTANEOUS at 10:19

## 2019-06-04 RX ADMIN — ACETAMINOPHEN 650 MG: 325 TABLET, FILM COATED ORAL at 00:03

## 2019-06-04 RX ADMIN — ACETAMINOPHEN 650 MG: 325 TABLET, FILM COATED ORAL at 12:26

## 2019-06-04 RX ADMIN — CELECOXIB 200 MG: 200 CAPSULE ORAL at 04:49

## 2019-06-04 RX ADMIN — HYDROMORPHONE HYDROCHLORIDE 0.5 MG: 1 INJECTION, SOLUTION INTRAMUSCULAR; INTRAVENOUS; SUBCUTANEOUS at 22:37

## 2019-06-04 RX ADMIN — GABAPENTIN 300 MG: 300 CAPSULE ORAL at 04:49

## 2019-06-04 RX ADMIN — Medication 1 EACH: at 18:07

## 2019-06-04 RX ADMIN — ACETAMINOPHEN 650 MG: 325 TABLET, FILM COATED ORAL at 18:06

## 2019-06-04 RX ADMIN — ACETAMINOPHEN 650 MG: 325 TABLET, FILM COATED ORAL at 04:49

## 2019-06-04 RX ADMIN — HYDROMORPHONE HYDROCHLORIDE 0.5 MG: 1 INJECTION, SOLUTION INTRAMUSCULAR; INTRAVENOUS; SUBCUTANEOUS at 15:24

## 2019-06-04 RX ADMIN — Medication 1 EACH: at 04:50

## 2019-06-04 RX ADMIN — METAXALONE 800 MG: 800 TABLET ORAL at 12:26

## 2019-06-04 RX ADMIN — ENOXAPARIN SODIUM 30 MG: 100 INJECTION SUBCUTANEOUS at 04:50

## 2019-06-04 ASSESSMENT — ENCOUNTER SYMPTOMS
FOCAL WEAKNESS: 0
SPEECH CHANGE: 0
ABDOMINAL PAIN: 0
ROS GI COMMENTS: LAST BOWEL MOVEMENT PTA
SENSORY CHANGE: 0
MYALGIAS: 1
DOUBLE VISION: 0
SHORTNESS OF BREATH: 0
FEVER: 0
NAUSEA: 0

## 2019-06-04 ASSESSMENT — LIFESTYLE VARIABLES: SUBSTANCE_ABUSE: 0

## 2019-06-04 NOTE — CARE PLAN
Problem: Bowel/Gastric:  Goal: Normal bowel function is maintained or improved  Outcome: PROGRESSING AS EXPECTED  Educate pt on importance of bowel motility, administer bowel medications as ordered, encourage ambulation and perform bowel assessments.     Problem: Respiratory:  Goal: Respiratory status will improve  Outcome: PROGRESSING AS EXPECTED  Educate pt on disease process, encourage IS use and perform respiratory assessment.
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
## 2019-06-04 NOTE — PROGRESS NOTES
Bedside report received.  Assessment complete.  A&O x 4. Patient calls appropriately.  Patient up with no assist.    Patient has 9/10 pain. Medication given (see MAR)  Denies N&V. Tolerating regular diet.  Left chest tube with dressing,CDI. Chest tube to water seal per order.   + void, + flatus, - BM.  Patient denies SOB.  Review plan with of care with patient. Call light and personal belongings with in reach. Hourly rounding in place. All needs met at this time.

## 2019-06-04 NOTE — PROGRESS NOTES
Report received from day shift RN, assumed Care.   Patient is AOx4, responds appropriately.      Pt reports pain 3/10, declines any intervention at this time.   Patient is tolerating regular diet, denies nausea/vomiting. + flatus. Last bowel movement 6/1, administering prescribed bowel medications per MAR. Bowel sounds hypoactive x4 quads.   Left chest tube in place to low continuous wall suction.   Up self with steady gait.    Plan of care discussed, all questions answered.    Educated on use of call light and importance of calling before getting out of bed. Pt verbalizes understanding.    Call light and belongings within reach, treaded slipper socks on, SCDs in use, bed in lowest locked position.  All needs met at this time.

## 2019-06-04 NOTE — CARE PLAN
Problem: Communication  Goal: The ability to communicate needs accurately and effectively will improve  Outcome: PROGRESSING AS EXPECTED  Review plan of care, encourage patient to ask questions and voice concerns     Problem: Pain Management  Goal: Pain level will decrease to patient's comfort goal  Outcome: PROGRESSING AS EXPECTED  Assess pain Q2-4H,administer pain medication as indicated, implement multi-modal pain administration,encourage patient to voice pain to staff, encourage use of heat pad

## 2019-06-05 ENCOUNTER — APPOINTMENT (OUTPATIENT)
Dept: RADIOLOGY | Facility: MEDICAL CENTER | Age: 24
DRG: 199 | End: 2019-06-05
Attending: SURGERY
Payer: MEDICAID

## 2019-06-05 LAB
BASOPHILS # BLD AUTO: 0.8 % (ref 0–1.8)
BASOPHILS # BLD: 0.06 K/UL (ref 0–0.12)
EOSINOPHIL # BLD AUTO: 0.41 K/UL (ref 0–0.51)
EOSINOPHIL NFR BLD: 5.5 % (ref 0–6.9)
ERYTHROCYTE [DISTWIDTH] IN BLOOD BY AUTOMATED COUNT: 39.8 FL (ref 35.9–50)
HCT VFR BLD AUTO: 27.7 % (ref 42–52)
HGB BLD-MCNC: 9.4 G/DL (ref 14–18)
IMM GRANULOCYTES # BLD AUTO: 0.05 K/UL (ref 0–0.11)
IMM GRANULOCYTES NFR BLD AUTO: 0.7 % (ref 0–0.9)
LYMPHOCYTES # BLD AUTO: 1.72 K/UL (ref 1–4.8)
LYMPHOCYTES NFR BLD: 23 % (ref 22–41)
MCH RBC QN AUTO: 30.7 PG (ref 27–33)
MCHC RBC AUTO-ENTMCNC: 33.9 G/DL (ref 33.7–35.3)
MCV RBC AUTO: 90.5 FL (ref 81.4–97.8)
MONOCYTES # BLD AUTO: 0.54 K/UL (ref 0–0.85)
MONOCYTES NFR BLD AUTO: 7.2 % (ref 0–13.4)
NEUTROPHILS # BLD AUTO: 4.71 K/UL (ref 1.82–7.42)
NEUTROPHILS NFR BLD: 62.8 % (ref 44–72)
NRBC # BLD AUTO: 0 K/UL
NRBC BLD-RTO: 0 /100 WBC
PLATELET # BLD AUTO: 238 K/UL (ref 164–446)
PMV BLD AUTO: 11 FL (ref 9–12.9)
RBC # BLD AUTO: 3.06 M/UL (ref 4.7–6.1)
WBC # BLD AUTO: 7.5 K/UL (ref 4.8–10.8)

## 2019-06-05 PROCEDURE — 700111 HCHG RX REV CODE 636 W/ 250 OVERRIDE (IP): Performed by: SURGERY

## 2019-06-05 PROCEDURE — A9270 NON-COVERED ITEM OR SERVICE: HCPCS | Performed by: NURSE PRACTITIONER

## 2019-06-05 PROCEDURE — A9270 NON-COVERED ITEM OR SERVICE: HCPCS | Performed by: SURGERY

## 2019-06-05 PROCEDURE — 700101 HCHG RX REV CODE 250: Performed by: SURGERY

## 2019-06-05 PROCEDURE — 700102 HCHG RX REV CODE 250 W/ 637 OVERRIDE(OP): Performed by: SURGERY

## 2019-06-05 PROCEDURE — 71045 X-RAY EXAM CHEST 1 VIEW: CPT

## 2019-06-05 PROCEDURE — 85025 COMPLETE CBC W/AUTO DIFF WBC: CPT

## 2019-06-05 PROCEDURE — 770006 HCHG ROOM/CARE - MED/SURG/GYN SEMI*

## 2019-06-05 PROCEDURE — 700102 HCHG RX REV CODE 250 W/ 637 OVERRIDE(OP): Performed by: NURSE PRACTITIONER

## 2019-06-05 PROCEDURE — 700112 HCHG RX REV CODE 229: Performed by: SURGERY

## 2019-06-05 PROCEDURE — C1729 CATH, DRAINAGE: HCPCS | Performed by: SURGERY

## 2019-06-05 PROCEDURE — 36415 COLL VENOUS BLD VENIPUNCTURE: CPT

## 2019-06-05 RX ADMIN — OXYCODONE HYDROCHLORIDE 5 MG: 5 TABLET ORAL at 00:29

## 2019-06-05 RX ADMIN — ENOXAPARIN SODIUM 30 MG: 100 INJECTION SUBCUTANEOUS at 17:27

## 2019-06-05 RX ADMIN — HYDROMORPHONE HYDROCHLORIDE 0.5 MG: 1 INJECTION, SOLUTION INTRAMUSCULAR; INTRAVENOUS; SUBCUTANEOUS at 17:26

## 2019-06-05 RX ADMIN — OXYCODONE HYDROCHLORIDE 5 MG: 5 TABLET ORAL at 15:57

## 2019-06-05 RX ADMIN — ACETAMINOPHEN 650 MG: 325 TABLET, FILM COATED ORAL at 17:27

## 2019-06-05 RX ADMIN — HYDROMORPHONE HYDROCHLORIDE 0.5 MG: 1 INJECTION, SOLUTION INTRAMUSCULAR; INTRAVENOUS; SUBCUTANEOUS at 20:24

## 2019-06-05 RX ADMIN — DOCUSATE SODIUM 100 MG: 100 CAPSULE, LIQUID FILLED ORAL at 17:27

## 2019-06-05 RX ADMIN — METAXALONE 800 MG: 800 TABLET ORAL at 06:32

## 2019-06-05 RX ADMIN — Medication 1 EACH: at 11:51

## 2019-06-05 RX ADMIN — ENOXAPARIN SODIUM 30 MG: 100 INJECTION SUBCUTANEOUS at 06:32

## 2019-06-05 RX ADMIN — ACETAMINOPHEN 650 MG: 325 TABLET, FILM COATED ORAL at 00:29

## 2019-06-05 RX ADMIN — HYDROMORPHONE HYDROCHLORIDE 0.5 MG: 1 INJECTION, SOLUTION INTRAMUSCULAR; INTRAVENOUS; SUBCUTANEOUS at 02:32

## 2019-06-05 RX ADMIN — MAGNESIUM HYDROXIDE 30 ML: 400 SUSPENSION ORAL at 06:31

## 2019-06-05 RX ADMIN — GABAPENTIN 300 MG: 300 CAPSULE ORAL at 11:51

## 2019-06-05 RX ADMIN — METAXALONE 800 MG: 800 TABLET ORAL at 11:53

## 2019-06-05 RX ADMIN — HYDROMORPHONE HYDROCHLORIDE 0.5 MG: 1 INJECTION, SOLUTION INTRAMUSCULAR; INTRAVENOUS; SUBCUTANEOUS at 07:23

## 2019-06-05 RX ADMIN — POLYETHYLENE GLYCOL 3350 1 PACKET: 17 POWDER, FOR SOLUTION ORAL at 06:32

## 2019-06-05 RX ADMIN — GABAPENTIN 300 MG: 300 CAPSULE ORAL at 17:27

## 2019-06-05 RX ADMIN — ACETAMINOPHEN 650 MG: 325 TABLET, FILM COATED ORAL at 06:32

## 2019-06-05 RX ADMIN — OXYCODONE HYDROCHLORIDE 5 MG: 5 TABLET ORAL at 11:51

## 2019-06-05 RX ADMIN — FAMOTIDINE 20 MG: 20 TABLET ORAL at 17:27

## 2019-06-05 RX ADMIN — Medication 1 EACH: at 06:32

## 2019-06-05 RX ADMIN — GABAPENTIN 300 MG: 300 CAPSULE ORAL at 06:32

## 2019-06-05 RX ADMIN — SENNOSIDES,DOCUSATE SODIUM 1 TABLET: 8.6; 5 TABLET, FILM COATED ORAL at 20:25

## 2019-06-05 RX ADMIN — CELECOXIB 200 MG: 200 CAPSULE ORAL at 17:27

## 2019-06-05 RX ADMIN — HYDROMORPHONE HYDROCHLORIDE 0.5 MG: 1 INJECTION, SOLUTION INTRAMUSCULAR; INTRAVENOUS; SUBCUTANEOUS at 22:51

## 2019-06-05 RX ADMIN — Medication 1 EACH: at 17:28

## 2019-06-05 RX ADMIN — CELECOXIB 200 MG: 200 CAPSULE ORAL at 06:32

## 2019-06-05 RX ADMIN — OXYCODONE HYDROCHLORIDE 5 MG: 5 TABLET ORAL at 21:37

## 2019-06-05 RX ADMIN — ACETAMINOPHEN 650 MG: 325 TABLET, FILM COATED ORAL at 11:51

## 2019-06-05 RX ADMIN — DOCUSATE SODIUM 100 MG: 100 CAPSULE, LIQUID FILLED ORAL at 06:32

## 2019-06-05 RX ADMIN — FAMOTIDINE 20 MG: 10 INJECTION INTRAVENOUS at 06:32

## 2019-06-05 RX ADMIN — METAXALONE 800 MG: 800 TABLET ORAL at 17:29

## 2019-06-05 RX ADMIN — HYDROMORPHONE HYDROCHLORIDE 0.5 MG: 1 INJECTION, SOLUTION INTRAMUSCULAR; INTRAVENOUS; SUBCUTANEOUS at 13:56

## 2019-06-05 ASSESSMENT — ENCOUNTER SYMPTOMS
FEVER: 0
SHORTNESS OF BREATH: 0
DOUBLE VISION: 0
FOCAL WEAKNESS: 0
ABDOMINAL PAIN: 0
SENSORY CHANGE: 0
MYALGIAS: 1
NAUSEA: 0

## 2019-06-05 ASSESSMENT — LIFESTYLE VARIABLES: SUBSTANCE_ABUSE: 0

## 2019-06-05 NOTE — CARE PLAN
Problem: Pain Management  Goal: Pain level will decrease to patient's comfort goal  Outcome: PROGRESSING AS EXPECTED  Assess pain Q2-4H, administer pain medication as indicated, implement multi-modal pain medication for pain relief, patient using heating pad for back spasms     Problem: Respiratory:  Goal: Respiratory status will improve  Outcome: PROGRESSING AS EXPECTED  Chest tube patent with no air leaks present, LLL diminished, all other lobes clear, patient denies SOB and is currently on RA    Problem: Mobility  Goal: Risk for activity intolerance will decrease  Outcome: PROGRESSING AS EXPECTED  Patient walks around unit independently once pain is adequately controlled, steady on feet.
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
## 2019-06-06 ENCOUNTER — APPOINTMENT (OUTPATIENT)
Dept: RADIOLOGY | Facility: MEDICAL CENTER | Age: 24
DRG: 199 | End: 2019-06-06
Attending: SURGERY
Payer: MEDICAID

## 2019-06-06 LAB
BASOPHILS # BLD AUTO: 0.7 % (ref 0–1.8)
BASOPHILS # BLD: 0.05 K/UL (ref 0–0.12)
EOSINOPHIL # BLD AUTO: 0.57 K/UL (ref 0–0.51)
EOSINOPHIL NFR BLD: 8.5 % (ref 0–6.9)
ERYTHROCYTE [DISTWIDTH] IN BLOOD BY AUTOMATED COUNT: 39.6 FL (ref 35.9–50)
HCT VFR BLD AUTO: 28.8 % (ref 42–52)
HGB BLD-MCNC: 9.8 G/DL (ref 14–18)
IMM GRANULOCYTES # BLD AUTO: 0.03 K/UL (ref 0–0.11)
IMM GRANULOCYTES NFR BLD AUTO: 0.4 % (ref 0–0.9)
LYMPHOCYTES # BLD AUTO: 1.64 K/UL (ref 1–4.8)
LYMPHOCYTES NFR BLD: 24.3 % (ref 22–41)
MCH RBC QN AUTO: 30.8 PG (ref 27–33)
MCHC RBC AUTO-ENTMCNC: 34 G/DL (ref 33.7–35.3)
MCV RBC AUTO: 90.6 FL (ref 81.4–97.8)
MONOCYTES # BLD AUTO: 0.55 K/UL (ref 0–0.85)
MONOCYTES NFR BLD AUTO: 8.2 % (ref 0–13.4)
NEUTROPHILS # BLD AUTO: 3.9 K/UL (ref 1.82–7.42)
NEUTROPHILS NFR BLD: 57.9 % (ref 44–72)
NRBC # BLD AUTO: 0 K/UL
NRBC BLD-RTO: 0 /100 WBC
PLATELET # BLD AUTO: 236 K/UL (ref 164–446)
PMV BLD AUTO: 10.9 FL (ref 9–12.9)
RBC # BLD AUTO: 3.18 M/UL (ref 4.7–6.1)
WBC # BLD AUTO: 6.7 K/UL (ref 4.8–10.8)

## 2019-06-06 PROCEDURE — A9270 NON-COVERED ITEM OR SERVICE: HCPCS | Performed by: SURGERY

## 2019-06-06 PROCEDURE — 770006 HCHG ROOM/CARE - MED/SURG/GYN SEMI*

## 2019-06-06 PROCEDURE — 71045 X-RAY EXAM CHEST 1 VIEW: CPT

## 2019-06-06 PROCEDURE — 700101 HCHG RX REV CODE 250: Performed by: SURGERY

## 2019-06-06 PROCEDURE — 700102 HCHG RX REV CODE 250 W/ 637 OVERRIDE(OP): Performed by: NURSE PRACTITIONER

## 2019-06-06 PROCEDURE — 700112 HCHG RX REV CODE 229: Performed by: SURGERY

## 2019-06-06 PROCEDURE — A9270 NON-COVERED ITEM OR SERVICE: HCPCS | Performed by: NURSE PRACTITIONER

## 2019-06-06 PROCEDURE — 36415 COLL VENOUS BLD VENIPUNCTURE: CPT

## 2019-06-06 PROCEDURE — 85025 COMPLETE CBC W/AUTO DIFF WBC: CPT

## 2019-06-06 PROCEDURE — 700111 HCHG RX REV CODE 636 W/ 250 OVERRIDE (IP): Performed by: SURGERY

## 2019-06-06 PROCEDURE — 700102 HCHG RX REV CODE 250 W/ 637 OVERRIDE(OP): Performed by: SURGERY

## 2019-06-06 RX ADMIN — Medication 1 EACH: at 11:24

## 2019-06-06 RX ADMIN — HYDROMORPHONE HYDROCHLORIDE 0.5 MG: 1 INJECTION, SOLUTION INTRAMUSCULAR; INTRAVENOUS; SUBCUTANEOUS at 11:24

## 2019-06-06 RX ADMIN — Medication 1 EACH: at 17:17

## 2019-06-06 RX ADMIN — GABAPENTIN 300 MG: 300 CAPSULE ORAL at 17:17

## 2019-06-06 RX ADMIN — HYDROMORPHONE HYDROCHLORIDE 0.5 MG: 1 INJECTION, SOLUTION INTRAMUSCULAR; INTRAVENOUS; SUBCUTANEOUS at 22:22

## 2019-06-06 RX ADMIN — FAMOTIDINE 20 MG: 10 INJECTION INTRAVENOUS at 05:40

## 2019-06-06 RX ADMIN — OXYCODONE HYDROCHLORIDE 5 MG: 5 TABLET ORAL at 20:50

## 2019-06-06 RX ADMIN — ENOXAPARIN SODIUM 30 MG: 100 INJECTION SUBCUTANEOUS at 05:40

## 2019-06-06 RX ADMIN — GABAPENTIN 300 MG: 300 CAPSULE ORAL at 05:40

## 2019-06-06 RX ADMIN — HYDROMORPHONE HYDROCHLORIDE 0.5 MG: 1 INJECTION, SOLUTION INTRAMUSCULAR; INTRAVENOUS; SUBCUTANEOUS at 13:58

## 2019-06-06 RX ADMIN — OXYCODONE HYDROCHLORIDE 5 MG: 5 TABLET ORAL at 05:49

## 2019-06-06 RX ADMIN — FAMOTIDINE 20 MG: 20 TABLET ORAL at 17:17

## 2019-06-06 RX ADMIN — HYDROMORPHONE HYDROCHLORIDE 0.5 MG: 1 INJECTION, SOLUTION INTRAMUSCULAR; INTRAVENOUS; SUBCUTANEOUS at 19:42

## 2019-06-06 RX ADMIN — DOCUSATE SODIUM 100 MG: 100 CAPSULE, LIQUID FILLED ORAL at 17:17

## 2019-06-06 RX ADMIN — Medication 1 EACH: at 05:40

## 2019-06-06 RX ADMIN — SENNOSIDES,DOCUSATE SODIUM 1 TABLET: 8.6; 5 TABLET, FILM COATED ORAL at 19:38

## 2019-06-06 RX ADMIN — METAXALONE 800 MG: 800 TABLET ORAL at 11:24

## 2019-06-06 RX ADMIN — POLYETHYLENE GLYCOL 3350 1 PACKET: 17 POWDER, FOR SOLUTION ORAL at 18:00

## 2019-06-06 RX ADMIN — OXYCODONE HYDROCHLORIDE 5 MG: 5 TABLET ORAL at 12:57

## 2019-06-06 RX ADMIN — METAXALONE 800 MG: 800 TABLET ORAL at 05:40

## 2019-06-06 RX ADMIN — METAXALONE 800 MG: 800 TABLET ORAL at 17:18

## 2019-06-06 RX ADMIN — ENOXAPARIN SODIUM 30 MG: 100 INJECTION SUBCUTANEOUS at 17:17

## 2019-06-06 RX ADMIN — OXYCODONE HYDROCHLORIDE 5 MG: 5 TABLET ORAL at 00:42

## 2019-06-06 RX ADMIN — GABAPENTIN 300 MG: 300 CAPSULE ORAL at 11:24

## 2019-06-06 RX ADMIN — HYDROMORPHONE HYDROCHLORIDE 0.5 MG: 1 INJECTION, SOLUTION INTRAMUSCULAR; INTRAVENOUS; SUBCUTANEOUS at 17:23

## 2019-06-06 RX ADMIN — HYDROMORPHONE HYDROCHLORIDE 0.5 MG: 1 INJECTION, SOLUTION INTRAMUSCULAR; INTRAVENOUS; SUBCUTANEOUS at 02:10

## 2019-06-06 RX ADMIN — OXYCODONE HYDROCHLORIDE 5 MG: 5 TABLET ORAL at 16:29

## 2019-06-06 ASSESSMENT — ENCOUNTER SYMPTOMS
NAUSEA: 0
MYALGIAS: 1
FOCAL WEAKNESS: 0
FEVER: 0
SENSORY CHANGE: 0
ABDOMINAL PAIN: 0
DOUBLE VISION: 0
SHORTNESS OF BREATH: 0

## 2019-06-06 ASSESSMENT — LIFESTYLE VARIABLES: SUBSTANCE_ABUSE: 0

## 2019-06-06 NOTE — CARE PLAN
Problem: Pain Management  Goal: Pain level will decrease to patient's comfort goal  Outcome: PROGRESSING AS EXPECTED  Assess pain Q2-4H, administer pain medication as indicated, encourage use of heat and ice for additional pain relief    Problem: Respiratory:  Goal: Respiratory status will improve  Outcome: PROGRESSING AS EXPECTED  Encourage patient to turn, cough, and deep breath; encourage frequent ambulation; reinforce education that patient should not try to hold his breath when he has pain, encourage IS use
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
## 2019-06-06 NOTE — CARE PLAN
Problem: Pain Management  Goal: Pain level will decrease to patient's comfort goal    Intervention: Follow pain managment plan developed in collaboration with patient and Interdisciplinary Team  Encouraged pt to verbalize when pain is not tolerable, assessing pain Q4 per protocol      Problem: Mobility  Goal: Risk for activity intolerance will decrease    Intervention: Encourage patient to increase activity level in collaboration with Interdisciplinary Team  Encouraged pt to increase mobility, to ambulate at least 3x a day for 50 ft occurences

## 2019-06-07 ENCOUNTER — APPOINTMENT (OUTPATIENT)
Dept: RADIOLOGY | Facility: MEDICAL CENTER | Age: 24
DRG: 199 | End: 2019-06-07
Attending: SURGERY
Payer: MEDICAID

## 2019-06-07 VITALS
HEIGHT: 66 IN | DIASTOLIC BLOOD PRESSURE: 59 MMHG | TEMPERATURE: 99.1 F | BODY MASS INDEX: 20.89 KG/M2 | HEART RATE: 97 BPM | SYSTOLIC BLOOD PRESSURE: 111 MMHG | RESPIRATION RATE: 16 BRPM | OXYGEN SATURATION: 96 % | WEIGHT: 130 LBS

## 2019-06-07 LAB
BASOPHILS # BLD AUTO: 0.6 % (ref 0–1.8)
BASOPHILS # BLD: 0.05 K/UL (ref 0–0.12)
EOSINOPHIL # BLD AUTO: 0.71 K/UL (ref 0–0.51)
EOSINOPHIL NFR BLD: 8.6 % (ref 0–6.9)
ERYTHROCYTE [DISTWIDTH] IN BLOOD BY AUTOMATED COUNT: 41 FL (ref 35.9–50)
HCT VFR BLD AUTO: 32.9 % (ref 42–52)
HGB BLD-MCNC: 11.3 G/DL (ref 14–18)
IMM GRANULOCYTES # BLD AUTO: 0.06 K/UL (ref 0–0.11)
IMM GRANULOCYTES NFR BLD AUTO: 0.7 % (ref 0–0.9)
LYMPHOCYTES # BLD AUTO: 1.91 K/UL (ref 1–4.8)
LYMPHOCYTES NFR BLD: 23.1 % (ref 22–41)
MCH RBC QN AUTO: 30.9 PG (ref 27–33)
MCHC RBC AUTO-ENTMCNC: 34.3 G/DL (ref 33.7–35.3)
MCV RBC AUTO: 89.9 FL (ref 81.4–97.8)
MONOCYTES # BLD AUTO: 0.58 K/UL (ref 0–0.85)
MONOCYTES NFR BLD AUTO: 7 % (ref 0–13.4)
NEUTROPHILS # BLD AUTO: 4.96 K/UL (ref 1.82–7.42)
NEUTROPHILS NFR BLD: 60 % (ref 44–72)
NRBC # BLD AUTO: 0 K/UL
NRBC BLD-RTO: 0 /100 WBC
PLATELET # BLD AUTO: 279 K/UL (ref 164–446)
PMV BLD AUTO: 10.6 FL (ref 9–12.9)
RBC # BLD AUTO: 3.66 M/UL (ref 4.7–6.1)
WBC # BLD AUTO: 8.3 K/UL (ref 4.8–10.8)

## 2019-06-07 PROCEDURE — 700111 HCHG RX REV CODE 636 W/ 250 OVERRIDE (IP): Performed by: SURGERY

## 2019-06-07 PROCEDURE — 71045 X-RAY EXAM CHEST 1 VIEW: CPT

## 2019-06-07 PROCEDURE — 85025 COMPLETE CBC W/AUTO DIFF WBC: CPT

## 2019-06-07 PROCEDURE — A9270 NON-COVERED ITEM OR SERVICE: HCPCS | Performed by: SURGERY

## 2019-06-07 PROCEDURE — 700102 HCHG RX REV CODE 250 W/ 637 OVERRIDE(OP): Performed by: NURSE PRACTITIONER

## 2019-06-07 PROCEDURE — 700101 HCHG RX REV CODE 250: Performed by: SURGERY

## 2019-06-07 PROCEDURE — 36415 COLL VENOUS BLD VENIPUNCTURE: CPT

## 2019-06-07 PROCEDURE — 700102 HCHG RX REV CODE 250 W/ 637 OVERRIDE(OP): Performed by: SURGERY

## 2019-06-07 PROCEDURE — 700112 HCHG RX REV CODE 229: Performed by: SURGERY

## 2019-06-07 PROCEDURE — A9270 NON-COVERED ITEM OR SERVICE: HCPCS | Performed by: NURSE PRACTITIONER

## 2019-06-07 RX ORDER — OXYCODONE HYDROCHLORIDE 5 MG/1
5 TABLET ORAL EVERY 6 HOURS PRN
Qty: 20 TAB | Refills: 0 | Status: SHIPPED | OUTPATIENT
Start: 2019-06-07 | End: 2019-06-12

## 2019-06-07 RX ADMIN — POLYETHYLENE GLYCOL 3350 1 PACKET: 17 POWDER, FOR SOLUTION ORAL at 06:27

## 2019-06-07 RX ADMIN — MAGNESIUM HYDROXIDE 30 ML: 400 SUSPENSION ORAL at 06:27

## 2019-06-07 RX ADMIN — Medication 1 EACH: at 13:01

## 2019-06-07 RX ADMIN — GABAPENTIN 300 MG: 300 CAPSULE ORAL at 06:27

## 2019-06-07 RX ADMIN — OXYCODONE HYDROCHLORIDE 5 MG: 5 TABLET ORAL at 00:04

## 2019-06-07 RX ADMIN — ENOXAPARIN SODIUM 30 MG: 100 INJECTION SUBCUTANEOUS at 06:27

## 2019-06-07 RX ADMIN — OXYCODONE HYDROCHLORIDE 5 MG: 5 TABLET ORAL at 07:33

## 2019-06-07 RX ADMIN — METAXALONE 800 MG: 800 TABLET ORAL at 13:01

## 2019-06-07 RX ADMIN — OXYCODONE HYDROCHLORIDE 5 MG: 5 TABLET ORAL at 04:10

## 2019-06-07 RX ADMIN — FAMOTIDINE 20 MG: 20 TABLET ORAL at 06:28

## 2019-06-07 RX ADMIN — METAXALONE 800 MG: 800 TABLET ORAL at 06:27

## 2019-06-07 RX ADMIN — HYDROMORPHONE HYDROCHLORIDE 0.5 MG: 1 INJECTION, SOLUTION INTRAMUSCULAR; INTRAVENOUS; SUBCUTANEOUS at 01:18

## 2019-06-07 RX ADMIN — HYDROMORPHONE HYDROCHLORIDE 0.5 MG: 1 INJECTION, SOLUTION INTRAMUSCULAR; INTRAVENOUS; SUBCUTANEOUS at 08:38

## 2019-06-07 RX ADMIN — Medication 1 EACH: at 06:27

## 2019-06-07 RX ADMIN — OXYCODONE HYDROCHLORIDE 5 MG: 5 TABLET ORAL at 13:01

## 2019-06-07 RX ADMIN — DOCUSATE SODIUM 100 MG: 100 CAPSULE, LIQUID FILLED ORAL at 06:28

## 2019-06-07 RX ADMIN — GABAPENTIN 300 MG: 300 CAPSULE ORAL at 13:01

## 2019-06-07 ASSESSMENT — ENCOUNTER SYMPTOMS
SHORTNESS OF BREATH: 0
FOCAL WEAKNESS: 0
DOUBLE VISION: 0
NAUSEA: 0
SENSORY CHANGE: 0
ABDOMINAL PAIN: 0
MYALGIAS: 0
FEVER: 0

## 2019-06-07 ASSESSMENT — LIFESTYLE VARIABLES: SUBSTANCE_ABUSE: 0
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
## 2019-06-07 NOTE — DISCHARGE INSTRUCTIONS
Discharge Instructions    Discharged to home by car with relative. Discharged via walking, hospital escort: Refused.  Special equipment needed: Not Applicable    Be sure to schedule a follow-up appointment with your primary care doctor or any specialists as instructed.     Discharge Plan:   Diet Plan: Discussed  Activity Level: Discussed  Smoking Cessation Offered: Patient Counseled  Confirmed Follow up Appointment: Patient to Call and Schedule Appointment  Confirmed Symptoms Management: Discussed  Medication Reconciliation Updated: Yes  Influenza Vaccine Indication: Patient Refuses    I understand that a diet low in cholesterol, fat, and sodium is recommended for good health. Unless I have been given specific instructions below for another diet, I accept this instruction as my diet prescription.   Other diet: Regular    Special Instructions: None    · Is patient discharged on Warfarin / Coumadin?   No     Depression / Suicide Risk    As you are discharged from this [facility] facility, it is important to learn how to keep safe from harming yourself.    Recognize the warning signs:  · Abrupt changes in personality, positive or negative- including increase in energy   · Giving away possessions  · Change in eating patterns- significant weight changes-  positive or negative  · Change in sleeping patterns- unable to sleep or sleeping all the time   · Unwillingness or inability to communicate  · Depression  · Unusual sadness, discouragement and loneliness  · Talk of wanting to die  · Neglect of personal appearance   · Rebelliousness- reckless behavior  · Withdrawal from people/activities they love  · Confusion- inability to concentrate     If you or a loved one observes any of these behaviors or has concerns about self-harm, here's what you can do:  · Talk about it- your feelings and reasons for harming yourself  · Remove any means that you might use to hurt yourself (examples: pills, rope, extension cords,  firearm)  · Get professional help from the community (Mental Health, Substance Abuse, psychological counseling)  · Do not be alone:Call your Safe Contact- someone whom you trust who will be there for you.  · Call your local CRISIS HOTLINE 490-4725 or 040-155-1389  · Call your local Children's Mobile Crisis Response Team [place] (401) 641-0230 or www.[employer]  · Call the toll free National Suicide Prevention Hotlines   · National Suicide Prevention Lifeline 456-928-IYHB (4859)  · [employer] Line [employer] 800-SUICIDE (490-5673)    Hemothorax  Introduction  Hemothorax is a buildup of blood between your lung and the wall of your chest (pleural cavity). It is usually caused by an injury that results in bleeding.  Hemothorax can be a dangerous condition. As blood builds up in the pleural cavity, it can press on your lung and make it hard for you to breathe. Your lung may collapse. This means that air leaks from your lung and builds up in your pleural cavity (pneumothorax). This prevents your lung from expanding.  What are the causes?  An injury (trauma) that causes a tear in a lung or in a blood vessel in the chest is the main cause of hemothorax. Other possible causes include:  · Tuberculosis.  · An injury caused by placing a tube into a blood vessel in the chest (central venous catheter).  · Cancer in the chest.  · A blood-clotting problem.  · Blood-thinning medicine.  · Lung or heart surgery.  What are the signs or symptoms?  Signs and symptoms may include:  · Rapid breathing.  · Difficulty breathing.  · Shortness of breath.  · Feeling light-headed.  · Anxiety.  · Restlessness.  · A rapid heart rate.  · Low blood pressure (hypotension).  · Chest pain.  · Cool, pale, blue, or sweaty skin.  How is this diagnosed?  Your health care provider may suspect a hemothorax from your signs and symptoms, especially if you had a recent injury. Your health care provider will also do a physical exam. This includes  checking your breathing. You may also have a chest X-ray. If the cause of the hemothorax is not known, fluid from the pleural space may be removed for testing.  How is this treated?  You may have an IV line started to give you fluids or blood from a donor (transfusion). Treatment usually includes placing a small, flexible tube (chest tube) into the pleural cavity to drain fluid, blood, or extra air. A chest tube can also re-expand your lung if it collapses. If bleeding continues after the chest tube is in place, you may need surgery to open the chest (thoracotomy) and control the bleeding.  This information is not intended to replace advice given to you by your health care provider. Make sure you discuss any questions you have with your health care provider.  Document Released: 09/13/2005 Document Revised: 05/25/2017 Document Reviewed: 09/23/2015  © 2017 Elsevier        Pneumothorax  Introduction  A pneumothorax, commonly called a collapsed lung, is a condition in which air leaks from a lung and builds up in the space between the lung and the chest wall (pleural space). The air in a pneumothorax is trapped outside the lung and takes up space, preventing the lung from fully expanding. This is a condition that usually occurs suddenly. The buildup of air may be small or large. A small pneumothorax may go away on its own. When a pneumothorax is larger, it will often require medical treatment and hospitalization.  What are the causes?  A pneumothorax can sometimes happen quickly with no apparent cause. People with underlying lung problems, particularly COPD or emphysema, are at higher risk of pneumothorax. However, pneumothorax can happen quickly even in people with no prior known lung problems. Trauma, surgery, medical procedures, or injury to the chest wall can also cause a pneumothorax.  What are the signs or symptoms?  Sometimes a pneumothorax will have no symptoms. When symptoms are present, they can  include:  · Chest pain.  · Shortness of breath.  · Increased rate of breathing.  · Bluish color to your lips or skin (cyanosis).  How is this diagnosed?  Pneumothorax is usually diagnosed by a chest X-ray or chest CT scan. Your health care provider will also take a medical history and perform a physical exam to determine why you may have a pneumothorax.  How is this treated?  A small pneumothorax may go away on its own without treatment. Extra oxygen can sometimes help a small pneumothorax go away more quickly. For a larger pneumothorax or a pneumothorax that is causing symptoms, a procedure is usually needed to drain the air. In some cases, the health care provider may drain the air using a needle. In other cases, a chest tube may be inserted into the pleural space. A chest tube is a small tube placed between the ribs and into the pleural space. This removes the extra air and allows the lung to expand back to its normal size. A large pneumothorax will usually require a hospital stay. If there is ongoing air leakage into the pleural space, then the chest tube may need to remain in place for several days until the air leak has healed. In some cases, surgery may be needed.  Follow these instructions at home:  · Only take over-the-counter or prescription medicines as directed by your health care provider.  · If a cough or pain makes it difficult for you to sleep at night, try sleeping in a semi-upright position in a recliner or by using 2 or 3 pillows.  · Rest and limit activity as directed by your health care provider.  · If you had a chest tube and it was removed, ask your health care provider when it is okay to remove the dressing. Until your health care provider says you can remove the dressing, do not allow it to get wet.  · Do not smoke. Smoking is a risk factor for pneumothorax.  · Do not fly in an airplane or scuba dive until your health care provider says it is okay.  · Follow up with your health care provider  as directed.  Get help right away if:  · You have increasing chest pain or shortness of breath.  · You have a cough that is not controlled with suppressants.  · You begin coughing up blood.  · You have pain that is getting worse or is not controlled with medicines.  · You cough up thick, discolored mucus (sputum) that is yellow to green in color.  · You have redness, increasing pain, or discharge at the site where a chest tube had been in place (if your pneumothorax was treated with a chest tube).  · The site where your chest tube was located opens up.  · You feel air coming out of the site where the chest tube was placed.  · You have a fever or persistent symptoms for more than 2-3 days.  · You have a fever and your symptoms suddenly get worse.  This information is not intended to replace advice given to you by your health care provider. Make sure you discuss any questions you have with your health care provider.  Document Released: 12/18/2006 Document Revised: 05/25/2017 Document Reviewed: 05/13/2015  © 2017 Elsevier

## 2019-06-07 NOTE — PROGRESS NOTES
Discharge paperwork discussed and signed. All questions answered. Prescription given and follow up information provided. Patient verbalized worsening symptoms and when to return to ER or call MD. Patient opted to ambulate out with his girlfriend.
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
## 2019-09-20 NOTE — PROGRESS NOTES
Pt aox 4. No visible signs of distress. VSS.  Tolerating medication regimen without any signs or symptoms of adverse reactions.  Pt reports 7 /10 pain, medicated per MAR.  Tolerating diet without any n/v. No new BM  Ambulatory to self  Dressings are CDI.  On RA no complaints of SOB. Chest tube to gravity.   Bed locked and in low position.  Call light within reach and able to make all needs be known.  Patient and friends/family were educated on proper visiting hours.   Will continue to monitor.   Kenalog Preparation: Kenalog

## 2019-11-30 ENCOUNTER — APPOINTMENT (OUTPATIENT)
Dept: RADIOLOGY | Facility: MEDICAL CENTER | Age: 24
End: 2019-11-30
Attending: EMERGENCY MEDICINE
Payer: MEDICAID

## 2019-11-30 ENCOUNTER — HOSPITAL ENCOUNTER (EMERGENCY)
Facility: MEDICAL CENTER | Age: 24
End: 2019-11-30
Attending: EMERGENCY MEDICINE
Payer: MEDICAID

## 2019-11-30 VITALS
WEIGHT: 137.79 LBS | SYSTOLIC BLOOD PRESSURE: 118 MMHG | HEIGHT: 67 IN | TEMPERATURE: 98.6 F | HEART RATE: 68 BPM | OXYGEN SATURATION: 100 % | DIASTOLIC BLOOD PRESSURE: 75 MMHG | BODY MASS INDEX: 21.63 KG/M2 | RESPIRATION RATE: 18 BRPM

## 2019-11-30 DIAGNOSIS — R07.89 CHEST WALL PAIN: ICD-10-CM

## 2019-11-30 DIAGNOSIS — R05.9 COUGH: ICD-10-CM

## 2019-11-30 PROCEDURE — 99284 EMERGENCY DEPT VISIT MOD MDM: CPT

## 2019-11-30 PROCEDURE — 71046 X-RAY EXAM CHEST 2 VIEWS: CPT

## 2019-11-30 PROCEDURE — A9270 NON-COVERED ITEM OR SERVICE: HCPCS | Performed by: EMERGENCY MEDICINE

## 2019-11-30 PROCEDURE — 700102 HCHG RX REV CODE 250 W/ 637 OVERRIDE(OP): Performed by: EMERGENCY MEDICINE

## 2019-11-30 RX ORDER — BENZONATATE 100 MG/1
100 CAPSULE ORAL 3 TIMES DAILY PRN
Qty: 15 CAP | Refills: 1 | Status: SHIPPED | OUTPATIENT
Start: 2019-11-30 | End: 2020-04-13

## 2019-11-30 RX ORDER — IBUPROFEN 600 MG/1
600 TABLET ORAL EVERY 6 HOURS PRN
Qty: 20 TAB | Refills: 0 | Status: SHIPPED | OUTPATIENT
Start: 2019-11-30 | End: 2019-12-04

## 2019-11-30 RX ORDER — IBUPROFEN 600 MG/1
600 TABLET ORAL ONCE
Status: COMPLETED | OUTPATIENT
Start: 2019-11-30 | End: 2019-11-30

## 2019-11-30 RX ORDER — IBUPROFEN 200 MG
800 TABLET ORAL EVERY 6 HOURS PRN
COMMUNITY

## 2019-11-30 RX ADMIN — IBUPROFEN 600 MG: 600 TABLET ORAL at 21:00

## 2019-11-30 SDOH — HEALTH STABILITY: MENTAL HEALTH: HOW OFTEN DO YOU HAVE 6 OR MORE DRINKS ON ONE OCCASION?: NEVER

## 2019-11-30 SDOH — HEALTH STABILITY: MENTAL HEALTH: HOW MANY STANDARD DRINKS CONTAINING ALCOHOL DO YOU HAVE ON A TYPICAL DAY?: 1 OR 2

## 2019-11-30 SDOH — HEALTH STABILITY: MENTAL HEALTH: HOW OFTEN DO YOU HAVE A DRINK CONTAINING ALCOHOL?: MONTHLY OR LESS

## 2019-11-30 ASSESSMENT — PAIN SCALES - WONG BAKER: WONGBAKER_NUMERICALRESPONSE: HURTS EVEN MORE

## 2019-11-30 ASSESSMENT — PAIN DESCRIPTION - DESCRIPTORS: DESCRIPTORS: PRESSURE;SHARP;SHOOTING;STABBING

## 2019-12-01 NOTE — ED NOTES
Discharge instructions reviewed with patient/family; Questions/concerns addressed; Patient able to ambulate with steady gait; A&O x4; GCS 15   Plan:  Continue same medications as prescribed for chronic pain  Dr. Daniel Wilson may prescribe any pain medications deemed necessary to take postoperatively. These may be taken safely WITH our medications as needed.    Follow up in 4 months or sooner if needed  Regular exercise and attention to emotional health and diet remain the most effective ways to treat chronic pain of all kinds  You may contact me with questions or concerns through [address]
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
## 2020-01-06 ENCOUNTER — HOSPITAL ENCOUNTER (EMERGENCY)
Facility: MEDICAL CENTER | Age: 25
End: 2020-01-06
Attending: EMERGENCY MEDICINE
Payer: MEDICAID

## 2020-01-06 ENCOUNTER — APPOINTMENT (OUTPATIENT)
Dept: RADIOLOGY | Facility: MEDICAL CENTER | Age: 25
End: 2020-01-06
Attending: EMERGENCY MEDICINE
Payer: MEDICAID

## 2020-01-06 VITALS
DIASTOLIC BLOOD PRESSURE: 57 MMHG | BODY MASS INDEX: 21.07 KG/M2 | HEART RATE: 74 BPM | HEIGHT: 67 IN | OXYGEN SATURATION: 97 % | SYSTOLIC BLOOD PRESSURE: 131 MMHG | RESPIRATION RATE: 20 BRPM | TEMPERATURE: 98.6 F | WEIGHT: 134.26 LBS

## 2020-01-06 DIAGNOSIS — T50.901A ACCIDENTAL DRUG OVERDOSE, INITIAL ENCOUNTER: ICD-10-CM

## 2020-01-06 LAB
ALBUMIN SERPL BCP-MCNC: 4.8 G/DL (ref 3.2–4.9)
ALBUMIN/GLOB SERPL: 1.8 G/DL
ALP SERPL-CCNC: 69 U/L (ref 30–99)
ALT SERPL-CCNC: 15 U/L (ref 2–50)
AMPHET UR QL SCN: POSITIVE
ANION GAP SERPL CALC-SCNC: 17 MMOL/L (ref 0–11.9)
APAP SERPL-MCNC: <5 UG/ML (ref 10–30)
AST SERPL-CCNC: 22 U/L (ref 12–45)
BARBITURATES UR QL SCN: NEGATIVE
BENZODIAZ UR QL SCN: NEGATIVE
BILIRUB SERPL-MCNC: 0.5 MG/DL (ref 0.1–1.5)
BUN SERPL-MCNC: 9 MG/DL (ref 8–22)
BZE UR QL SCN: NEGATIVE
CALCIUM SERPL-MCNC: 9.6 MG/DL (ref 8.4–10.2)
CANNABINOIDS UR QL SCN: POSITIVE
CHLORIDE SERPL-SCNC: 100 MMOL/L (ref 96–112)
CO2 SERPL-SCNC: 22 MMOL/L (ref 20–33)
CREAT SERPL-MCNC: 0.83 MG/DL (ref 0.5–1.4)
EKG IMPRESSION: NORMAL
ETHANOL BLD-MCNC: <10 MG/DL (ref 0–10)
GLOBULIN SER CALC-MCNC: 2.7 G/DL (ref 1.9–3.5)
GLUCOSE SERPL-MCNC: 131 MG/DL (ref 65–99)
METHADONE UR QL SCN: NEGATIVE
OPIATES UR QL SCN: NEGATIVE
OXYCODONE UR QL SCN: NEGATIVE
PCP UR QL SCN: NEGATIVE
POTASSIUM SERPL-SCNC: 3.5 MMOL/L (ref 3.6–5.5)
PROPOXYPH UR QL SCN: NEGATIVE
PROT SERPL-MCNC: 7.5 G/DL (ref 6–8.2)
SALICYLATES SERPL-MCNC: <1 MG/DL (ref 15–25)
SODIUM SERPL-SCNC: 139 MMOL/L (ref 135–145)

## 2020-01-06 PROCEDURE — 700111 HCHG RX REV CODE 636 W/ 250 OVERRIDE (IP): Performed by: EMERGENCY MEDICINE

## 2020-01-06 PROCEDURE — 80053 COMPREHEN METABOLIC PANEL: CPT

## 2020-01-06 PROCEDURE — 99284 EMERGENCY DEPT VISIT MOD MDM: CPT

## 2020-01-06 PROCEDURE — 80307 DRUG TEST PRSMV CHEM ANLYZR: CPT

## 2020-01-06 PROCEDURE — 71046 X-RAY EXAM CHEST 2 VIEWS: CPT

## 2020-01-06 PROCEDURE — 96374 THER/PROPH/DIAG INJ IV PUSH: CPT

## 2020-01-06 PROCEDURE — 93005 ELECTROCARDIOGRAM TRACING: CPT | Performed by: EMERGENCY MEDICINE

## 2020-01-06 RX ORDER — ONDANSETRON 2 MG/ML
4 INJECTION INTRAMUSCULAR; INTRAVENOUS ONCE
Status: COMPLETED | OUTPATIENT
Start: 2020-01-06 | End: 2020-01-06

## 2020-01-06 RX ORDER — NALOXONE HYDROCHLORIDE 4 MG/.1ML
4 SPRAY NASAL ONCE
Status: SHIPPED | OUTPATIENT
Start: 2020-01-06 | End: 2020-01-07

## 2020-01-06 RX ADMIN — ONDANSETRON 4 MG: 2 INJECTION INTRAMUSCULAR; INTRAVENOUS at 19:35

## 2020-01-07 NOTE — DISCHARGE INSTRUCTIONS
You were evaluated today for depression, accidental overdose. Your physical exam and labs were reassuring. You were medically cleared in the emergency department.     You are being given mental health resources for our community.    If you have thoughts of suicide, please seek help. This may be a family member, friend, mental health professional, suicide hotline, or any emergency department.     National Suicide Prevention Lifeline: 1-331.475.5465  Available 24hours a day, 7 days a week    You are being prescribed a medication that can reverse the effects of opioid overdose.  This may be helpful for you or anyone you know who suffers an opioid overdose.  This can be lifesaving.  Please use only if needed in the setting of overdose of suspected opioids.    Please return to the ED or seek medical attention if you develop:  Fever, severe headache, vomiting, thoughts of suicide or other concerning findings
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
## 2020-04-13 ENCOUNTER — HOSPITAL ENCOUNTER (EMERGENCY)
Facility: MEDICAL CENTER | Age: 25
End: 2020-04-13
Attending: EMERGENCY MEDICINE
Payer: MEDICAID

## 2020-04-13 VITALS
HEART RATE: 75 BPM | RESPIRATION RATE: 16 BRPM | WEIGHT: 143 LBS | SYSTOLIC BLOOD PRESSURE: 103 MMHG | HEIGHT: 67 IN | BODY MASS INDEX: 22.44 KG/M2 | TEMPERATURE: 97.9 F | OXYGEN SATURATION: 95 % | DIASTOLIC BLOOD PRESSURE: 54 MMHG

## 2020-04-13 DIAGNOSIS — S05.01XA ABRASION OF RIGHT CORNEA, INITIAL ENCOUNTER: ICD-10-CM

## 2020-04-13 DIAGNOSIS — H57.8A9 SENSATION OF FOREIGN BODY IN EYE: ICD-10-CM

## 2020-04-13 DIAGNOSIS — H57.89 SCLERAL INJECTION: ICD-10-CM

## 2020-04-13 DIAGNOSIS — H53.8 BLURRY VISION, RIGHT EYE: ICD-10-CM

## 2020-04-13 PROCEDURE — 700101 HCHG RX REV CODE 250: Performed by: EMERGENCY MEDICINE

## 2020-04-13 PROCEDURE — 99284 EMERGENCY DEPT VISIT MOD MDM: CPT

## 2020-04-13 RX ORDER — ERYTHROMYCIN 5 MG/G
1 OINTMENT OPHTHALMIC 3 TIMES DAILY
Qty: 1 TUBE | Refills: 0 | Status: SHIPPED | OUTPATIENT
Start: 2020-04-13 | End: 2020-04-18

## 2020-04-13 RX ORDER — PROPARACAINE HYDROCHLORIDE 5 MG/ML
1 SOLUTION/ DROPS OPHTHALMIC ONCE
Status: COMPLETED | OUTPATIENT
Start: 2020-04-13 | End: 2020-04-13

## 2020-04-13 RX ADMIN — FLUORESCEIN SODIUM 1 MG: 1 STRIP OPHTHALMIC at 20:00

## 2020-04-13 RX ADMIN — PROPARACAINE HYDROCHLORIDE 1 DROP: 5 SOLUTION/ DROPS OPHTHALMIC at 20:00

## 2020-04-13 ASSESSMENT — FIBROSIS 4 INDEX: FIB4 SCORE: 0.45
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
## 2020-04-14 NOTE — ED NOTES
Discharge instructions provided.  Pt verbalized the understanding of discharge instructions to follow up with opthamology and to return to ER if condition worsens.  Pt ambulated out of ER without difficulty.

## 2020-04-27 ENCOUNTER — APPOINTMENT (OUTPATIENT)
Dept: RADIOLOGY | Facility: IMAGING CENTER | Age: 25
End: 2020-04-27
Attending: PHYSICIAN ASSISTANT
Payer: MEDICAID

## 2020-04-27 ENCOUNTER — OFFICE VISIT (OUTPATIENT)
Dept: URGENT CARE | Facility: PHYSICIAN GROUP | Age: 25
End: 2020-04-27
Payer: MEDICAID

## 2020-04-27 VITALS
HEART RATE: 72 BPM | WEIGHT: 140 LBS | BODY MASS INDEX: 21.97 KG/M2 | TEMPERATURE: 99.6 F | HEIGHT: 67 IN | DIASTOLIC BLOOD PRESSURE: 68 MMHG | RESPIRATION RATE: 16 BRPM | SYSTOLIC BLOOD PRESSURE: 120 MMHG | OXYGEN SATURATION: 96 %

## 2020-04-27 DIAGNOSIS — M25.531 RIGHT WRIST PAIN: ICD-10-CM

## 2020-04-27 DIAGNOSIS — M79.641 RIGHT HAND PAIN: ICD-10-CM

## 2020-04-27 DIAGNOSIS — S63.501A SPRAIN OF RIGHT WRIST, INITIAL ENCOUNTER: ICD-10-CM

## 2020-04-27 PROCEDURE — 73110 X-RAY EXAM OF WRIST: CPT | Mod: TC,RT | Performed by: FAMILY MEDICINE

## 2020-04-27 PROCEDURE — 73130 X-RAY EXAM OF HAND: CPT | Mod: TC,RT | Performed by: FAMILY MEDICINE

## 2020-04-27 PROCEDURE — 99214 OFFICE O/P EST MOD 30 MIN: CPT | Performed by: PHYSICIAN ASSISTANT

## 2020-04-27 ASSESSMENT — FIBROSIS 4 INDEX: FIB4 SCORE: 0.45

## 2020-04-27 ASSESSMENT — PAIN SCALES - GENERAL: PAINLEVEL: 10=SEVERE PAIN

## 2020-04-27 ASSESSMENT — ENCOUNTER SYMPTOMS
NUMBNESS: 0
TINGLING: 0
FALLS: 1
MUSCLE WEAKNESS: 0
SENSORY CHANGE: 0
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
## 2020-05-06 ENCOUNTER — HOSPITAL ENCOUNTER (EMERGENCY)
Facility: MEDICAL CENTER | Age: 25
End: 2020-05-06
Attending: EMERGENCY MEDICINE
Payer: MEDICAID

## 2020-05-06 ENCOUNTER — APPOINTMENT (OUTPATIENT)
Dept: RADIOLOGY | Facility: MEDICAL CENTER | Age: 25
End: 2020-05-06
Attending: EMERGENCY MEDICINE
Payer: MEDICAID

## 2020-05-06 VITALS
HEART RATE: 75 BPM | RESPIRATION RATE: 18 BRPM | SYSTOLIC BLOOD PRESSURE: 108 MMHG | TEMPERATURE: 98.9 F | HEIGHT: 67 IN | DIASTOLIC BLOOD PRESSURE: 66 MMHG | WEIGHT: 136.69 LBS | OXYGEN SATURATION: 96 % | BODY MASS INDEX: 21.45 KG/M2

## 2020-05-06 DIAGNOSIS — S63.501A SPRAIN OF RIGHT WRIST, INITIAL ENCOUNTER: ICD-10-CM
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
## 2020-05-06 PROCEDURE — 99283 EMERGENCY DEPT VISIT LOW MDM: CPT

## 2020-05-06 PROCEDURE — 73110 X-RAY EXAM OF WRIST: CPT | Mod: RT

## 2020-05-06 ASSESSMENT — ENCOUNTER SYMPTOMS
NECK PAIN: 0
FALLS: 1
SHORTNESS OF BREATH: 0
COUGH: 0
FEVER: 0
HEADACHES: 0

## 2020-05-06 ASSESSMENT — FIBROSIS 4 INDEX: FIB4 SCORE: 0.45

## 2020-05-06 NOTE — DISCHARGE INSTRUCTIONS
There is no evidence of new or old, healing fracture on your x-ray today.  I suspect that this is likely a wrist sprain.  I would recommend you continue to wear your wrist splint for the next 1 to 4 weeks depending on your pain.  You can gradually decrease your use of it as your pain improves.  If pain persists, I would recommend that you follow-up with orthopedics.  Please see your discharge papers for the orthopedist on call at the [facility].

## 2020-05-06 NOTE — ED NOTES
D/c pt home,no rx given . Pt aware of f/u instructions with Ortho asap , aware to return for any changes or concerns. No further questions upon d/c home from ed
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
## 2020-08-26 ENCOUNTER — HOSPITAL ENCOUNTER (EMERGENCY)
Facility: MEDICAL CENTER | Age: 25
End: 2020-08-26
Attending: EMERGENCY MEDICINE
Payer: MEDICAID

## 2020-08-26 ENCOUNTER — APPOINTMENT (OUTPATIENT)
Dept: RADIOLOGY | Facility: MEDICAL CENTER | Age: 25
End: 2020-08-26
Attending: EMERGENCY MEDICINE
Payer: MEDICAID

## 2020-08-26 VITALS
TEMPERATURE: 98.6 F | DIASTOLIC BLOOD PRESSURE: 87 MMHG | RESPIRATION RATE: 17 BRPM | WEIGHT: 128.09 LBS | SYSTOLIC BLOOD PRESSURE: 122 MMHG | OXYGEN SATURATION: 98 % | HEART RATE: 80 BPM | HEIGHT: 67 IN | BODY MASS INDEX: 20.1 KG/M2

## 2020-08-26 DIAGNOSIS — R51.9 NONINTRACTABLE EPISODIC HEADACHE, UNSPECIFIED HEADACHE TYPE: ICD-10-CM

## 2020-08-26 PROCEDURE — 96372 THER/PROPH/DIAG INJ SC/IM: CPT

## 2020-08-26 PROCEDURE — 70450 CT HEAD/BRAIN W/O DYE: CPT

## 2020-08-26 PROCEDURE — 700111 HCHG RX REV CODE 636 W/ 250 OVERRIDE (IP): Performed by: EMERGENCY MEDICINE

## 2020-08-26 PROCEDURE — 99284 EMERGENCY DEPT VISIT MOD MDM: CPT

## 2020-08-26 RX ORDER — SUMATRIPTAN 6 MG/.5ML
6 INJECTION, SOLUTION SUBCUTANEOUS ONCE
Status: COMPLETED | OUTPATIENT
Start: 2020-08-26 | End: 2020-08-26

## 2020-08-26 RX ADMIN — SUMATRIPTAN SUCCINATE 6 MG: 6 INJECTION SUBCUTANEOUS at 11:30

## 2020-08-26 ASSESSMENT — FIBROSIS 4 INDEX: FIB4 SCORE: 0.47

## 2020-08-26 NOTE — ED TRIAGE NOTES
Ambulatory to triage with c/o   Chief Complaint   Patient presents with   • Headache   Above complaint x 2 days. Sates left sided headache that starts around eye and radiates to back of head. Denies other symptoms. States family hx of migraine.

## 2020-08-26 NOTE — ED PROVIDER NOTES
ED Provider Note    CHIEF COMPLAINT  Chief Complaint   Patient presents with   • Headache       HPI  Baldev Chicas is a 25 y.o. male who presents complaining of episodic headache that has been affecting the left side of the patient's face.  The pain came on slowly this morning.  It was not thunderclap.  It came on within the last 3 to 4 hours.  Patient states a dull aching pain.  He denies scotomata.  He states he is got a family history of migraines.  Said he is been having intermittent headaches over the left side of his face for the past week.  He denies change in his vision.  He denies fever or chills.  There is no neck stiffness.  There is no fever.  There is no cough or cold symptoms.    REVIEW OF SYSTEMS  See HPI for further details. All other systems are negative.    PAST MEDICAL HISTORY  History reviewed. No pertinent past medical history.    FAMILY HISTORY  History reviewed. No pertinent family history.    SOCIAL HISTORY  Social History     Socioeconomic History   • Marital status: Single     Spouse name: Not on file   • Number of children: Not on file   • Years of education: Not on file   • Highest education level: Not on file   Occupational History   • Not on file   Social Needs   • Financial resource strain: Not on file   • Food insecurity     Worry: Not on file     Inability: Not on file   • Transportation needs     Medical: Not on file     Non-medical: Not on file   Tobacco Use   • Smoking status: Former Smoker     Packs/day: 0.00     Types: Cigarettes   • Smokeless tobacco: Never Used   Substance and Sexual Activity   • Alcohol use: Not Currently     Frequency: Monthly or less     Drinks per session: 1 or 2     Binge frequency: Never     Comment: occ   • Drug use: Yes     Types: Inhaled     Comment: marijuana   • Sexual activity: Not on file   Lifestyle   • Physical activity     Days per week: Not on file     Minutes per session: Not on file   • Stress: Not on file   Relationships   • Social  "connections     Talks on phone: Not on file     Gets together: Not on file     Attends Yazidism service: Not on file     Active member of club or organization: Not on file     Attends meetings of clubs or organizations: Not on file     Relationship status: Not on file   • Intimate partner violence     Fear of current or ex partner: Not on file     Emotionally abused: Not on file     Physically abused: Not on file     Forced sexual activity: Not on file   Other Topics Concern   • Not on file   Social History Narrative    ** Merged History Encounter **            SURGICAL HISTORY  History reviewed. No pertinent surgical history.    CURRENT MEDICATIONS  Home Medications     Reviewed by Rachel Jesus R.N. (Registered Nurse) on 08/26/20 at 1049  Med List Status: Complete   Medication Last Dose Status   ibuprofen (MOTRIN) 200 MG Tab  Active                ALLERGIES  No Known Allergies    PHYSICAL EXAM  VITAL SIGNS: /69   Pulse 64   Temp 36 °C (96.8 °F) (Temporal)   Resp 16   Ht 1.702 m (5' 7\")   Wt 58.1 kg (128 lb 1.4 oz)   SpO2 97%   BMI 20.06 kg/m²   Constitutional:  Well developed, Well nourished, No acute distress,   HENT: Normocephalic atraumatic.  Nontender left face.  Eyes: PERRLA, EOMI, Conjunctiva normal,   Neck: Normal range of motion, No tenderness, Supple, No stridor.   Lymphatic: No lymphadenopathy noted.   Cardiovascular: Normal heart rate, Normal rhythm, No murmurs, No rubs, No gallops.   Thorax & Lungs: Normal breath sounds, No respiratory distress, .   Skin: Warm, Dry, No erythema, No rash.   Extremities: No edema, No tenderness, No cyanosis, No clubbing. Dorsalis pedis pulses 2+ equal bilaterally. Radial pulses 2+ equal bilaterally  Neurologic: Alert & oriented x 3, Normal motor function, Normal sensory function, No focal deficits noted.   Psychiatric: Affect normal, Judgment normal, Mood normal.     RADIOLOGY/PROCEDURES      CT-HEAD W/O   Final Result      No acute intracranial " abnormality.          COURSE & MEDICAL DECISION MAKING  Pertinent Labs & Imaging studies reviewed. (See chart for details)  Patient's had recurrent episodic headache.  He denies this being the worst headache of his life.  Differential diagnosis includes brain mass, intracranial hemorrhage, tension type headaches, migraine headache.    CT scan was negative for intracranial hemorrhage.  Patient's headache was resolved with Imitrex.  The headache is come on within the last 4 hours.  I feel that the CT can adequately rule out subarachnoid hemorrhage in that he does not need a spinal tap at this point.  Patient was discharged home in stable condition.  He knows he needs to follow-up with primary care physician for possible migraine prophylaxis    FINAL IMPRESSION  1.  Episodic headache  2.   3.        Electronically signed by: Dat Perry M.D., 8/26/2020 1:17 PM

## 2020-09-01 ENCOUNTER — OFFICE VISIT (OUTPATIENT)
Dept: URGENT CARE | Facility: CLINIC | Age: 25
End: 2020-09-01
Payer: MEDICAID

## 2020-09-01 VITALS
TEMPERATURE: 97.9 F | SYSTOLIC BLOOD PRESSURE: 112 MMHG | OXYGEN SATURATION: 97 % | RESPIRATION RATE: 16 BRPM | DIASTOLIC BLOOD PRESSURE: 64 MMHG | HEART RATE: 60 BPM

## 2020-09-01 DIAGNOSIS — R51.9 ACUTE NONINTRACTABLE HEADACHE, UNSPECIFIED HEADACHE TYPE: ICD-10-CM

## 2020-09-01 PROCEDURE — 99214 OFFICE O/P EST MOD 30 MIN: CPT | Performed by: PHYSICIAN ASSISTANT

## 2020-09-01 RX ORDER — KETOROLAC TROMETHAMINE 30 MG/ML
30 INJECTION, SOLUTION INTRAMUSCULAR; INTRAVENOUS ONCE
Status: DISCONTINUED | OUTPATIENT
Start: 2020-09-01 | End: 2020-09-01

## 2020-09-01 RX ORDER — KETOROLAC TROMETHAMINE 30 MG/ML
30 INJECTION, SOLUTION INTRAMUSCULAR; INTRAVENOUS ONCE
Status: COMPLETED | OUTPATIENT
Start: 2020-09-01 | End: 2020-09-01

## 2020-09-01 RX ADMIN — KETOROLAC TROMETHAMINE 30 MG: 30 INJECTION, SOLUTION INTRAMUSCULAR; INTRAVENOUS at 13:34

## 2020-09-01 ASSESSMENT — ENCOUNTER SYMPTOMS
DIZZINESS: 0
HEADACHES: 1
VISUAL CHANGE: 1
ABNORMAL BEHAVIOR: 0
SEIZURES: 0
SPEECH CHANGE: 0
PHOTOPHOBIA: 1
FACIAL SWEATING: 0
EYE REDNESS: 0
TREMORS: 0
LOSS OF BALANCE: 0
ABDOMINAL PAIN: 0
FOCAL WEAKNESS: 0
LOSS OF CONSCIOUSNESS: 0
TINGLING: 0
BACK PAIN: 0
SINUS PRESSURE: 1
BLURRED VISION: 0
SENSORY CHANGE: 0
WEAKNESS: 0
SCALP TENDERNESS: 0
EYE WATERING: 0
EYE PAIN: 1

## 2020-09-01 NOTE — PROGRESS NOTES
Subjective:   Baldev Chicas is a 25 y.o. male who presents today with   Chief Complaint   Patient presents with   • Migraine     x 1.5 weeks constant       Headache   This is a new problem. The current episode started today. The problem occurs constantly. The problem has been unchanged. The pain is located in the left unilateral region. The pain quality is similar to prior headaches. The quality of the pain is described as aching. The pain is moderate. Associated symptoms include eye pain (twitching), photophobia, sinus pressure and a visual change (mild ). Pertinent negatives include no abdominal pain, abnormal behavior, back pain, blurred vision, dizziness, ear pain, eye redness, eye watering, facial sweating, loss of balance, phonophobia, scalp tenderness, seizures, tingling or weakness. Nothing aggravates the symptoms. He has tried NSAIDs and Excedrin for the symptoms. The treatment provided mild relief.     Patient states his headache start with his left that twitches and then causes headache to wrap around towards the back of his head.  He denies any dizziness or fatigue.  He states that he does have some vision change where his vision seems to go out of focus when the eye is twitching but then it comes back into focus.  He states that he was seen in the emergency room about a week ago and had sumatriptan injection which helped for a couple of hours but then it seemed to come back and then subsided over the next couple of days and was gone for several days until this morning.  CT scan was performed at the ER with no acute abnormalities.  PMH:  has no past medical history of Asthma, CAD (coronary artery disease), Cancer (HCC), Chronic obstructive pulmonary disease (HCC), Congestive heart failure (HCC), Diabetes (HCC), Hypertension, Infectious disease, Liver disease, Psychiatric disorder, Renal disorder, Seizure disorder (HCC), or Stroke ([facility]).  MEDS:   Current Outpatient Medications:   •  ibuprofen  (MOTRIN) 200 MG Tab, Take 800 mg by mouth every 6 hours as needed., Disp: , Rfl:     Current Facility-Administered Medications:   •  ketorolac (TORADOL) injection 30 mg, 30 mg, Intramuscular, Once, Cr Daley P.A.-C.  ALLERGIES: No Known Allergies  SURGHX: No past surgical history on file.  SOCHX:  reports that he has quit smoking. His smoking use included cigarettes. He smoked 0.00 packs per day. He has never used smokeless tobacco. He reports previous alcohol use. He reports current drug use. Drug: Inhaled.  FH: Reviewed with patient, not pertinent to this visit.       Review of Systems   HENT: Positive for sinus pressure. Negative for ear pain.    Eyes: Positive for photophobia and pain (twitching). Negative for blurred vision and redness.   Gastrointestinal: Negative for abdominal pain.   Musculoskeletal: Negative for back pain.   Neurological: Positive for headaches. Negative for dizziness, tingling, tremors, sensory change, speech change, focal weakness, seizures, loss of consciousness, weakness and loss of balance.   All other systems reviewed and are negative.       Objective:   /64 (BP Location: Left arm, Patient Position: Sitting, BP Cuff Size: Adult)   Pulse 60   Temp 36.6 °C (97.9 °F) (Temporal)   Resp 16   SpO2 97%   Physical Exam  Vitals signs and nursing note reviewed.   Constitutional:       General: He is not in acute distress.     Appearance: Normal appearance. He is well-developed. He is not ill-appearing or toxic-appearing.   HENT:      Head: Normocephalic and atraumatic.      Right Ear: Hearing, tympanic membrane and ear canal normal.      Left Ear: Hearing, tympanic membrane and ear canal normal.   Eyes:      General:         Right eye: No discharge.         Left eye: No discharge.      Extraocular Movements: Extraocular movements intact.      Conjunctiva/sclera: Conjunctivae normal.      Pupils: Pupils are equal, round, and reactive to light.      Comments: No left sided corneal  haze.   Cardiovascular:      Rate and Rhythm: Normal rate and regular rhythm.      Heart sounds: Normal heart sounds.   Pulmonary:      Effort: Pulmonary effort is normal.   Musculoskeletal:      Comments: Normal movement in all 4 extremities   Skin:     General: Skin is warm and dry.   Neurological:      General: No focal deficit present.      Mental Status: He is alert and oriented to person, place, and time.      Cranial Nerves: No cranial nerve deficit.      Sensory: No sensory deficit.      Motor: No weakness.      Coordination: Coordination normal.   Psychiatric:         Mood and Affect: Mood normal.       Assessment/Plan:   Assessment    1. Acute nonintractable headache, unspecified headache type  - REFERRAL TO NEUROLOGY  - ketorolac (TORADOL) injection 30 mg  Patient will follow-up with neurology at this time given recurrence of headaches.  Also recommend eye exam at this time given that he used to wear glasses and is supposed to be wearing them.  Headaches could be caused by eyestrain.  Differential diagnosis, natural history, supportive care, and indications for immediate follow-up discussed.   Discussed potential side effects of medication and to not take more NSAIDS today after Toradol injection.   Patient given instructions and understanding of medications and treatment.    If not improving in 3-5 days, F/U with PCP or return to UC if symptoms worsen.  Strict ER precautions given as discussed.  Patient agreeable to plan.    Please note that this dictation was created using voice recognition software. I have made every reasonable attempt to correct obvious errors, but I expect that there are errors of grammar and possibly content that I did not discover before finalizing the note.    Cr Daley PA-C

## 2020-09-12 ENCOUNTER — HOSPITAL ENCOUNTER (EMERGENCY)
Facility: MEDICAL CENTER | Age: 25
End: 2020-09-13
Attending: EMERGENCY MEDICINE
Payer: MEDICAID

## 2020-09-12 ENCOUNTER — APPOINTMENT (OUTPATIENT)
Dept: RADIOLOGY | Facility: MEDICAL CENTER | Age: 25
End: 2020-09-12
Attending: EMERGENCY MEDICINE
Payer: MEDICAID

## 2020-09-12 DIAGNOSIS — E86.0 DEHYDRATION: ICD-10-CM

## 2020-09-12 DIAGNOSIS — R11.2 INTRACTABLE VOMITING WITH NAUSEA, UNSPECIFIED VOMITING TYPE: Primary | ICD-10-CM

## 2020-09-12 LAB
ALBUMIN SERPL BCP-MCNC: 5.6 G/DL (ref 3.2–4.9)
ALBUMIN/GLOB SERPL: 1.8 G/DL
ALP SERPL-CCNC: 79 U/L (ref 30–99)
ALT SERPL-CCNC: 19 U/L (ref 2–50)
ANION GAP SERPL CALC-SCNC: 23 MMOL/L (ref 7–16)
AST SERPL-CCNC: 22 U/L (ref 12–45)
BASOPHILS # BLD AUTO: 0.3 % (ref 0–1.8)
BASOPHILS # BLD: 0.07 K/UL (ref 0–0.12)
BILIRUB SERPL-MCNC: 1.2 MG/DL (ref 0.1–1.5)
BUN SERPL-MCNC: 23 MG/DL (ref 8–22)
CALCIUM SERPL-MCNC: 10.7 MG/DL (ref 8.5–10.5)
CHLORIDE SERPL-SCNC: 90 MMOL/L (ref 96–112)
CO2 SERPL-SCNC: 23 MMOL/L (ref 20–33)
CREAT SERPL-MCNC: 1.06 MG/DL (ref 0.5–1.4)
EKG IMPRESSION: NORMAL
EOSINOPHIL # BLD AUTO: 0 K/UL (ref 0–0.51)
EOSINOPHIL NFR BLD: 0 % (ref 0–6.9)
ERYTHROCYTE [DISTWIDTH] IN BLOOD BY AUTOMATED COUNT: 38.2 FL (ref 35.9–50)
GLOBULIN SER CALC-MCNC: 3.1 G/DL (ref 1.9–3.5)
GLUCOSE SERPL-MCNC: 113 MG/DL (ref 65–99)
HCT VFR BLD AUTO: 49.7 % (ref 42–52)
HGB BLD-MCNC: 17.6 G/DL (ref 14–18)
IMM GRANULOCYTES # BLD AUTO: 0.09 K/UL (ref 0–0.11)
IMM GRANULOCYTES NFR BLD AUTO: 0.4 % (ref 0–0.9)
LIPASE SERPL-CCNC: 20 U/L (ref 11–82)
LYMPHOCYTES # BLD AUTO: 0.68 K/UL (ref 1–4.8)
LYMPHOCYTES NFR BLD: 3 % (ref 22–41)
MAGNESIUM SERPL-MCNC: 1.9 MG/DL (ref 1.5–2.5)
MCH RBC QN AUTO: 30.4 PG (ref 27–33)
MCHC RBC AUTO-ENTMCNC: 35.4 G/DL (ref 33.7–35.3)
MCV RBC AUTO: 86 FL (ref 81.4–97.8)
MONOCYTES # BLD AUTO: 0.76 K/UL (ref 0–0.85)
MONOCYTES NFR BLD AUTO: 3.3 % (ref 0–13.4)
NEUTROPHILS # BLD AUTO: 21.45 K/UL (ref 1.82–7.42)
NEUTROPHILS NFR BLD: 93 % (ref 44–72)
NRBC # BLD AUTO: 0 K/UL
NRBC BLD-RTO: 0 /100 WBC
PHOSPHATE SERPL-MCNC: 3.8 MG/DL (ref 2.5–4.5)
PLATELET # BLD AUTO: 358 K/UL (ref 164–446)
PMV BLD AUTO: 10.8 FL (ref 9–12.9)
POTASSIUM SERPL-SCNC: 4.3 MMOL/L (ref 3.6–5.5)
PROT SERPL-MCNC: 8.7 G/DL (ref 6–8.2)
RBC # BLD AUTO: 5.78 M/UL (ref 4.7–6.1)
SODIUM SERPL-SCNC: 136 MMOL/L (ref 135–145)
WBC # BLD AUTO: 23.1 K/UL (ref 4.8–10.8)

## 2020-09-12 PROCEDURE — 85025 COMPLETE CBC W/AUTO DIFF WBC: CPT

## 2020-09-12 PROCEDURE — 36415 COLL VENOUS BLD VENIPUNCTURE: CPT

## 2020-09-12 PROCEDURE — 83690 ASSAY OF LIPASE: CPT

## 2020-09-12 PROCEDURE — 96374 THER/PROPH/DIAG INJ IV PUSH: CPT

## 2020-09-12 PROCEDURE — 700111 HCHG RX REV CODE 636 W/ 250 OVERRIDE (IP): Performed by: EMERGENCY MEDICINE

## 2020-09-12 PROCEDURE — 93005 ELECTROCARDIOGRAM TRACING: CPT | Performed by: EMERGENCY MEDICINE

## 2020-09-12 PROCEDURE — 700105 HCHG RX REV CODE 258: Performed by: EMERGENCY MEDICINE

## 2020-09-12 PROCEDURE — 74177 CT ABD & PELVIS W/CONTRAST: CPT

## 2020-09-12 PROCEDURE — 96375 TX/PRO/DX INJ NEW DRUG ADDON: CPT

## 2020-09-12 PROCEDURE — 99284 EMERGENCY DEPT VISIT MOD MDM: CPT

## 2020-09-12 PROCEDURE — 84100 ASSAY OF PHOSPHORUS: CPT

## 2020-09-12 PROCEDURE — 83735 ASSAY OF MAGNESIUM: CPT

## 2020-09-12 PROCEDURE — 80053 COMPREHEN METABOLIC PANEL: CPT

## 2020-09-12 PROCEDURE — 700117 HCHG RX CONTRAST REV CODE 255: Performed by: EMERGENCY MEDICINE

## 2020-09-12 RX ORDER — SODIUM CHLORIDE 9 MG/ML
1000 INJECTION, SOLUTION INTRAVENOUS ONCE
Status: COMPLETED | OUTPATIENT
Start: 2020-09-12 | End: 2020-09-12

## 2020-09-12 RX ORDER — METOCLOPRAMIDE HYDROCHLORIDE 5 MG/ML
5 INJECTION INTRAMUSCULAR; INTRAVENOUS ONCE
Status: COMPLETED | OUTPATIENT
Start: 2020-09-12 | End: 2020-09-12

## 2020-09-12 RX ORDER — ONDANSETRON 2 MG/ML
4 INJECTION INTRAMUSCULAR; INTRAVENOUS ONCE
Status: COMPLETED | OUTPATIENT
Start: 2020-09-12 | End: 2020-09-12

## 2020-09-12 RX ADMIN — ONDANSETRON 4 MG: 2 INJECTION INTRAMUSCULAR; INTRAVENOUS at 22:22

## 2020-09-12 RX ADMIN — IOHEXOL 80 ML: 350 INJECTION, SOLUTION INTRAVENOUS at 23:35

## 2020-09-12 RX ADMIN — SODIUM CHLORIDE 1000 ML: 9 INJECTION, SOLUTION INTRAVENOUS at 21:41

## 2020-09-12 RX ADMIN — METOCLOPRAMIDE 5 MG: 5 INJECTION, SOLUTION INTRAMUSCULAR; INTRAVENOUS at 22:23

## 2020-09-12 ASSESSMENT — ENCOUNTER SYMPTOMS
CHILLS: 0
VOMITING: 1
DIARRHEA: 0
ABDOMINAL PAIN: 0
FEVER: 0

## 2020-09-12 ASSESSMENT — FIBROSIS 4 INDEX: FIB4 SCORE: 0.47

## 2020-09-13 VITALS
DIASTOLIC BLOOD PRESSURE: 80 MMHG | BODY MASS INDEX: 20.09 KG/M2 | TEMPERATURE: 99.1 F | WEIGHT: 128 LBS | HEART RATE: 85 BPM | RESPIRATION RATE: 15 BRPM | SYSTOLIC BLOOD PRESSURE: 124 MMHG | OXYGEN SATURATION: 94 % | HEIGHT: 67 IN

## 2020-09-13 RX ORDER — ONDANSETRON 4 MG/1
4 TABLET, ORALLY DISINTEGRATING ORAL EVERY 6 HOURS PRN
Qty: 16 TAB | Refills: 0 | Status: SHIPPED | OUTPATIENT
Start: 2020-09-13 | End: 2020-09-17

## 2020-09-13 ASSESSMENT — ENCOUNTER SYMPTOMS: CONSTIPATION: 0

## 2020-09-13 NOTE — ED PROVIDER NOTES
ED Provider Note    Scribed for MOR Schmid II* by Willy Hunt. 9/12/2020  9:25 PM    Means of Arrival: walk-in  History obtained by: patient  Limitations: none    CHIEF COMPLAINT  Chief Complaint   Patient presents with   • Vomiting     pt has been vommiting for 3 days, keller not been able to keep anything down. pt states his body will cramp up and stay that way for 15 minutes       PPE Note: I personally donned full PPE for all patient encounters during this visit, including being clean-shaven with an N95 respirator mask, gloves, and eye protection. Scribe remained outside the patient's room and did not have any contact with the patient for the duration of patient encounter.      HPI  Baldev Chicas is a 25 y.o. male who presents to the Emergency Department with complaints of mild-moderate vomiting acute onset 3 days ago. He has not been able to tolerate PO fluids or solids. No alleviating factors noted. He states that he began having associated cramps in his hands and legs today. He also reports an episode where he was talking and felt like his tongue was swelling in his mouth. He is not having those symptoms at this time.  He denies any diarrhea, abdominal pain, or fever. He has not had many bowel movements recently. He has been taking Excedrin for his migraines, but he has not been taking any other new medications. No prior abdominal surgeries.     He has been a daily cannabis user for several years. He has not had this problem before.     REVIEW OF SYSTEMS  Review of Systems   Constitutional: Negative for chills and fever.   Gastrointestinal: Positive for vomiting. Negative for abdominal pain, constipation and diarrhea.   All other systems reviewed and are negative.    See HPI for further details.    PAST MEDICAL HISTORY  None noted when reviewed.     SOCIAL HISTORY  Social History     Tobacco Use   • Smoking status: Former Smoker     Packs/day: 0.00     Types: Cigarettes   • Smokeless tobacco:  "Never Used   Substance and Sexual Activity   • Alcohol use: Not Currently     Frequency: Monthly or less     Drinks per session: 1 or 2     Binge frequency: Never     Comment: occ   • Drug use: Yes     Types: Inhaled     Comment: marijuana       SURGICAL HISTORY  patient denies any surgical history    CURRENT MEDICATIONS  Current Outpatient Medications   Medication Instructions   • ibuprofen (MOTRIN) 800 mg, Oral, EVERY 6 HOURS PRN       ALLERGIES  No Known Allergies    PHYSICAL EXAM  VITAL SIGNS: /88   Pulse (!) 113   Temp 36.8 °C (98.2 °F) (Temporal)   Resp 18   Ht 1.702 m (5' 7\")   Wt 58.1 kg (128 lb)   SpO2 96%   BMI 20.05 kg/m²     Pulse ox interpretation: I interpret this pulse ox as normal.  Constitutional: Well-appearing 25 y.o.  male in no acute distress. Pleasant.   HENT: No signs of trauma, Bilateral external ears normal, Nose normal.   Eyes: Pupils are equal, Conjunctiva normal, Non-icteric.   Neck: Normal range of motion, No tenderness, Supple, No stridor.   Cardiovascular: Tachycardic rate and regular rhythm. Symmetric distal pulses. No cyanosis of extremities. No peripheral edema of extremities.  Thorax & Lungs: Normal breath sounds, No respiratory distress, No wheezing, No chest tenderness.   Abdomen: Soft, No tenderness, No guarding, non-distended, No masses, No pulsatile masses. No peritoneal signs.  Skin: Warm, Dry, No erythema, No rash.   Back: No midline bony tenderness, No CVA tenderness.   Musculoskeletal: Good range of motion in all major joints. No tenderness to palpation or major deformities noted.   Neurologic: Alert , Normal motor function, Normal sensory function, No focal deficits noted.   Psychiatric: Affect normal, Judgment normal, Mood normal.       DIAGNOSTIC STUDIES / PROCEDURES    EK-lead EKG interpreted by me as noted below.  Results for orders placed or performed during the hospital encounter of 20   EKG   Result Value Ref Range    Report       " [facility] Emergency Dept.    Test Date:  2020  Pt Name:    TAYLOR SUTTON                 Department: ER  MRN:        [id number]                      Room:       [facility]  Gender:     Male                         Technician: [id number]  :        1995                   Requested By:MARTIR JUNIOR II  Order #:    [id number]                    Reading MD: Martir Junior II, MD    Measurements  Intervals                                Axis  Rate:       91                           P:          80  WA:         136                          QRS:        32  QRSD:       84                           T:          57  QT:         404  QTc:        498    Interpretive Statements  SINUS RHYTHM  Rate 91  Normal intervals  No ST elevation or depression. Normal twaves.  Impression: Normal EKG. No change from previous.  Compared to ECG 2020 19:22:08  Electronically Signed On 2020 22:42:30 PDT by Martir Junior II, MD         LABS  Pertinent Labs & Imaging studies reviewed. (See chart for details)    COURSE & MEDICAL DECISION MAKING  Pertinent Labs & Imaging studies reviewed. (See chart for details)    9:25 PM This is a 25 y.o. male who presents with complaints of vomiting for the past 3 days and the differential diagnosis includes but is not limited to electrolyte abnormality, dehydration, dyspepsia, and cannabinoid hyperemesis. Ordered for CBC w/ diff, CMP, magnesium, phosphorus, and an EKG to evaluate. Patient will be treated with NS infusion 1L for vomiting, tachycardia, and dehydration.     10:23 PM - Reviewed patient's labs which show evidence of an elevated WBC of 23.1.  Metabolic panel shows bicarb 23 with a anion gap 23 just light elevated.  BUN and creatinine borderline.  Suspect likely due to dehydration.  CT-abdomen/pelvis w/ ordered secondary to elevated WBC count, vomiting for 3 days, and tachycardia to rule out acute abdominal pathology.     12:29 AM  CT on room.  I suspect given  normal CT that white count of 23 is reactive and dehydration is contributory.  He has received 1 L fluid bolus and antiemetics.  He is now tolerating oral fluids.  He denies any symptoms at this time.  Plan for discharge.  I do have some suspicion that frequent vomiting could be due to cannabis given his frequent and chronic use.  He understands this.  He is going to try to decrease or abstain from use.  He was discharged in good condition.    HYDRATION: Based on the patient's presentation of Acute Vomiting and Tachycardia the patient was given IV fluids. IV Hydration was used because oral hydration was not adequate alone. Upon recheck following hydration, the patient was improved, with improved heart rate, moist mucus membranes, and resolution of nausea.     The patient will return for worsening symptoms and is stable at the time of discharge. The patient verbalizes understanding and will comply. Guidance provided on appropriate use of medications.    The patient is referred to a primary physician for blood pressure management, diabetic screening, and for all other preventative health concerns.    DISPOSITION:  Patient will be discharged home in stable condition.    FOLLOW UP:  [facility], Emergency Dept  [address] [facility] [facility] [address] [phone]    Continued vomiting, fevers, blood in vomit or stools or other serious concerns    [address] [address] [phone]  Call   For primary care, general health care, screenings, vaccines      OUTPATIENT MEDICATIONS:  New Prescriptions    ONDANSETRON (ZOFRAN ODT) 4 MG TABLET DISPERSIBLE    Take 1 Tab by mouth every 6 hours as needed for Nausea for up to 4 days.        FINAL IMPRESSION  1. Intractable vomiting with nausea, unspecified vomiting type    2. Dehydration          IWilly (Scribyudi), am scribing for, and in the presence of, Martir Serrano II M*.    Electronically  signed by: Willy Hunt (Scribe), 9/12/2020    IMartir II, M* personally performed the services described in this documentation, as scribed by Willy Hunt in my presence, and it is both accurate and complete.    The note accurately reflects work and decisions made by me.  Martir Serrano II, M.D.  9/13/2020  12:31 AM

## 2020-09-13 NOTE — ED TRIAGE NOTES
"Chief Complaint   Patient presents with   • Vomiting     pt has been vommiting for 3 days, keller not been able to keep anything down. pt states his body will cramp up and stay that way for 15 minutes         Pt walk in today for above complaint. Pt speaking full sentences. Pt has not been able to eat for 3 days because he cannot keep anything up. Pt AOx4, no signs of distress      /88   Pulse (!) 113   Temp 36.8 °C (98.2 °F) (Temporal)   Resp 18   Ht 1.702 m (5' 7\")   Wt 58.1 kg (128 lb)   SpO2 96%   BMI 20.05 kg/m²     "

## 2020-09-13 NOTE — ED NOTES
Discharge instructions provided with prescription teaching, pt verbalizes understanding. Pt is awake, alert, VSS. Pt ambulatory with steady gait out of ER with grandmother.

## 2020-09-13 NOTE — ED NOTES
PIV established, blood drawn, sent to lab. IV NS infusing per ERP orders. Updated pt on POC. Call light in reach.

## 2021-03-16 ENCOUNTER — HOSPITAL ENCOUNTER (EMERGENCY)
Facility: MEDICAL CENTER | Age: 26
End: 2021-03-16
Attending: EMERGENCY MEDICINE
Payer: MEDICAID

## 2021-03-16 VITALS
TEMPERATURE: 98.8 F | SYSTOLIC BLOOD PRESSURE: 124 MMHG | HEART RATE: 76 BPM | OXYGEN SATURATION: 94 % | RESPIRATION RATE: 16 BRPM | BODY MASS INDEX: 19.31 KG/M2 | DIASTOLIC BLOOD PRESSURE: 75 MMHG | WEIGHT: 123.02 LBS | HEIGHT: 67 IN

## 2021-03-16 DIAGNOSIS — E87.6 HYPOKALEMIA: ICD-10-CM

## 2021-03-16 DIAGNOSIS — N17.9 AKI (ACUTE KIDNEY INJURY) (HCC): ICD-10-CM

## 2021-03-16 DIAGNOSIS — F12.10 MARIJUANA ABUSE: ICD-10-CM

## 2021-03-16 DIAGNOSIS — E86.0 DEHYDRATION: ICD-10-CM

## 2021-03-16 DIAGNOSIS — R11.2 NON-INTRACTABLE VOMITING WITH NAUSEA, UNSPECIFIED VOMITING TYPE: ICD-10-CM

## 2021-03-16 LAB
ALBUMIN SERPL BCP-MCNC: 4.5 G/DL (ref 3.2–4.9)
ALBUMIN SERPL BCP-MCNC: 6 G/DL (ref 3.2–4.9)
ALBUMIN/GLOB SERPL: 1.7 G/DL
ALBUMIN/GLOB SERPL: 1.9 G/DL
ALP SERPL-CCNC: 59 U/L (ref 30–99)
ALP SERPL-CCNC: 80 U/L (ref 30–99)
ALT SERPL-CCNC: 15 U/L (ref 2–50)
ALT SERPL-CCNC: 24 U/L (ref 2–50)
ANION GAP SERPL CALC-SCNC: 13 MMOL/L (ref 7–16)
ANION GAP SERPL CALC-SCNC: 23 MMOL/L (ref 7–16)
AST SERPL-CCNC: 17 U/L (ref 12–45)
AST SERPL-CCNC: 24 U/L (ref 12–45)
BASOPHILS # BLD AUTO: 0.2 % (ref 0–1.8)
BASOPHILS # BLD: 0.03 K/UL (ref 0–0.12)
BILIRUB SERPL-MCNC: 1.5 MG/DL (ref 0.1–1.5)
BILIRUB SERPL-MCNC: 2.2 MG/DL (ref 0.1–1.5)
BUN SERPL-MCNC: 48 MG/DL (ref 8–22)
BUN SERPL-MCNC: 55 MG/DL (ref 8–22)
CALCIUM SERPL-MCNC: 10.8 MG/DL (ref 8.5–10.5)
CALCIUM SERPL-MCNC: 8.5 MG/DL (ref 8.5–10.5)
CHLORIDE SERPL-SCNC: 76 MMOL/L (ref 96–112)
CHLORIDE SERPL-SCNC: 92 MMOL/L (ref 96–112)
CO2 SERPL-SCNC: 30 MMOL/L (ref 20–33)
CO2 SERPL-SCNC: 31 MMOL/L (ref 20–33)
CREAT SERPL-MCNC: 1.41 MG/DL (ref 0.5–1.4)
CREAT SERPL-MCNC: 1.8 MG/DL (ref 0.5–1.4)
EKG IMPRESSION: NORMAL
EOSINOPHIL # BLD AUTO: 0.01 K/UL (ref 0–0.51)
EOSINOPHIL NFR BLD: 0.1 % (ref 0–6.9)
ERYTHROCYTE [DISTWIDTH] IN BLOOD BY AUTOMATED COUNT: 38.4 FL (ref 35.9–50)
GLOBULIN SER CALC-MCNC: 2.4 G/DL (ref 1.9–3.5)
GLOBULIN SER CALC-MCNC: 3.5 G/DL (ref 1.9–3.5)
GLUCOSE SERPL-MCNC: 129 MG/DL (ref 65–99)
GLUCOSE SERPL-MCNC: 140 MG/DL (ref 65–99)
HCT VFR BLD AUTO: 50.2 % (ref 42–52)
HGB BLD-MCNC: 18.4 G/DL (ref 14–18)
IMM GRANULOCYTES # BLD AUTO: 0.09 K/UL (ref 0–0.11)
IMM GRANULOCYTES NFR BLD AUTO: 0.5 % (ref 0–0.9)
LIPASE SERPL-CCNC: 61 U/L (ref 11–82)
LYMPHOCYTES # BLD AUTO: 1.12 K/UL (ref 1–4.8)
LYMPHOCYTES NFR BLD: 6.6 % (ref 22–41)
MAGNESIUM SERPL-MCNC: 2.4 MG/DL (ref 1.5–2.5)
MCH RBC QN AUTO: 30.8 PG (ref 27–33)
MCHC RBC AUTO-ENTMCNC: 36.7 G/DL (ref 33.7–35.3)
MCV RBC AUTO: 83.9 FL (ref 81.4–97.8)
MONOCYTES # BLD AUTO: 1.89 K/UL (ref 0–0.85)
MONOCYTES NFR BLD AUTO: 11.1 % (ref 0–13.4)
NEUTROPHILS # BLD AUTO: 13.93 K/UL (ref 1.82–7.42)
NEUTROPHILS NFR BLD: 81.5 % (ref 44–72)
NRBC # BLD AUTO: 0 K/UL
NRBC BLD-RTO: 0 /100 WBC
PLATELET # BLD AUTO: 376 K/UL (ref 164–446)
PMV BLD AUTO: 11.7 FL (ref 9–12.9)
POTASSIUM SERPL-SCNC: 3 MMOL/L (ref 3.6–5.5)
POTASSIUM SERPL-SCNC: 3.1 MMOL/L (ref 3.6–5.5)
PROT SERPL-MCNC: 6.9 G/DL (ref 6–8.2)
PROT SERPL-MCNC: 9.5 G/DL (ref 6–8.2)
RBC # BLD AUTO: 5.98 M/UL (ref 4.7–6.1)
SODIUM SERPL-SCNC: 129 MMOL/L (ref 135–145)
SODIUM SERPL-SCNC: 136 MMOL/L (ref 135–145)
WBC # BLD AUTO: 17.1 K/UL (ref 4.8–10.8)

## 2021-03-16 PROCEDURE — 96374 THER/PROPH/DIAG INJ IV PUSH: CPT

## 2021-03-16 PROCEDURE — 83735 ASSAY OF MAGNESIUM: CPT

## 2021-03-16 PROCEDURE — 700105 HCHG RX REV CODE 258: Performed by: EMERGENCY MEDICINE

## 2021-03-16 PROCEDURE — A9270 NON-COVERED ITEM OR SERVICE: HCPCS | Performed by: EMERGENCY MEDICINE

## 2021-03-16 PROCEDURE — 99285 EMERGENCY DEPT VISIT HI MDM: CPT

## 2021-03-16 PROCEDURE — 36415 COLL VENOUS BLD VENIPUNCTURE: CPT

## 2021-03-16 PROCEDURE — 80053 COMPREHEN METABOLIC PANEL: CPT | Mod: 91

## 2021-03-16 PROCEDURE — 700111 HCHG RX REV CODE 636 W/ 250 OVERRIDE (IP): Performed by: EMERGENCY MEDICINE

## 2021-03-16 PROCEDURE — 700102 HCHG RX REV CODE 250 W/ 637 OVERRIDE(OP): Performed by: EMERGENCY MEDICINE

## 2021-03-16 PROCEDURE — 85025 COMPLETE CBC W/AUTO DIFF WBC: CPT

## 2021-03-16 PROCEDURE — 83690 ASSAY OF LIPASE: CPT

## 2021-03-16 PROCEDURE — 93005 ELECTROCARDIOGRAM TRACING: CPT | Performed by: EMERGENCY MEDICINE

## 2021-03-16 PROCEDURE — 93005 ELECTROCARDIOGRAM TRACING: CPT

## 2021-03-16 PROCEDURE — 96361 HYDRATE IV INFUSION ADD-ON: CPT

## 2021-03-16 RX ORDER — POTASSIUM CHLORIDE 20 MEQ/1
40 TABLET, EXTENDED RELEASE ORAL ONCE
Status: COMPLETED | OUTPATIENT
Start: 2021-03-16 | End: 2021-03-16

## 2021-03-16 RX ORDER — ONDANSETRON 4 MG/1
4 TABLET, ORALLY DISINTEGRATING ORAL EVERY 6 HOURS PRN
Qty: 10 TABLET | Refills: 0 | Status: SHIPPED | OUTPATIENT
Start: 2021-03-16

## 2021-03-16 RX ORDER — SODIUM CHLORIDE 9 MG/ML
1000 INJECTION, SOLUTION INTRAVENOUS ONCE
Status: COMPLETED | OUTPATIENT
Start: 2021-03-16 | End: 2021-03-16

## 2021-03-16 RX ORDER — HALOPERIDOL 5 MG/ML
5 INJECTION INTRAMUSCULAR ONCE
Status: COMPLETED | OUTPATIENT
Start: 2021-03-16 | End: 2021-03-16

## 2021-03-16 RX ADMIN — SODIUM CHLORIDE 1000 ML: 9 INJECTION, SOLUTION INTRAVENOUS at 14:24

## 2021-03-16 RX ADMIN — HALOPERIDOL LACTATE 5 MG: 5 INJECTION, SOLUTION INTRAMUSCULAR at 14:24

## 2021-03-16 RX ADMIN — SODIUM CHLORIDE 1000 ML: 9 INJECTION, SOLUTION INTRAVENOUS at 15:50

## 2021-03-16 RX ADMIN — POTASSIUM CHLORIDE 40 MEQ: 1500 TABLET, EXTENDED RELEASE ORAL at 18:10

## 2021-03-16 ASSESSMENT — FIBROSIS 4 INDEX: FIB4 SCORE: 0.32

## 2021-03-16 ASSESSMENT — LIFESTYLE VARIABLES: DO YOU DRINK ALCOHOL: NO

## 2021-03-16 NOTE — ED TRIAGE NOTES
"Chief Complaint   Patient presents with   • N/V     onset two days ago     Pulse 61   Temp 37.9 °C (100.3 °F)   Resp 18   Ht 1.702 m (5' 7\")   Wt 55.8 kg (123 lb 0.3 oz)   SpO2 91%   BMI 19.27 kg/m²     26 y/o male presents to ED with complaint of numerous episodes of N/V over past two days. Patient states he has tried to drink, 'electrolyte water' but can't keep it down. No diarrhea or abdominal pain. He states he cannot recall eating any bad food. No hematemesis.   "

## 2021-03-16 NOTE — ED NOTES
Pt ambulated to yellow 63, agree with triage note.  Pt also reports chest pain to left side of chest that has also been going on since N/V started.  Pt having cramping to UE with BP cuff.  Pt appears anxious.  Pt reports pain 7/10 at this time.  Pt placed on monitors and in gown. Placed order for EKG.  ERP to see.

## 2021-03-16 NOTE — ED PROVIDER NOTES
ED Provider Note    Scribed for Raisa Murrell M.D. by Shankar Perez. 3/16/2021  2:07 PM    Means of arrival: Walk-In  History obtained from: Patient  History limited by: None      CHIEF COMPLAINT  Chief Complaint   Patient presents with   • N/V     onset two days ago       HPI  Baldev Chicas is a 25 y.o. male who presents to the Emergency Department for evaluation of vomiting onset three days ago. Patient has associated nausea, tactile fevers, abdominal pain, chest pain, inability to keep any food down, and constipation. Patient states that he has not had a bowel movement in over a week. Denies any diarrhea or cough. No alleviating or exacerbating factors reported. Patient states that he did have similar symptoms before back in September 2020, and it was believed that it was due to his marijuana use. Patient notes that he is still using marijuana, but not nearly as much as he had been before. Denies any previous abdominal surgeries. Denies any alcohol or opiate use.     REVIEW OF SYSTEMS  Pertinent positive include vomiting, nausea, tactile fevers, abdominal pain, chest pain, inability to keep any food down, and constipation. Pertinent negative include diarrhea or cough. All other systems reviewed and are negative.    PAST MEDICAL HISTORY  None noted    SOCIAL HISTORY  Social History     Tobacco Use   • Smoking status: Former Smoker     Packs/day: 0.00     Types: Cigarettes   • Smokeless tobacco: Never Used   Substance and Sexual Activity   • Alcohol use: Not Currently     Comment: occ   • Drug use: Yes     Types: Inhaled     Comment: marijuana     SURGICAL HISTORY  patient denies any surgical history    CURRENT MEDICATIONS  Home Medications     Reviewed by Rusty Cueto R.N. (Registered Nurse) on 03/16/21 at 1330  Med List Status: Not Addressed   Medication Last Dose Status   ibuprofen (MOTRIN) 200 MG Tab  Active                ALLERGIES  No Known Allergies    PHYSICAL EXAM   VITAL SIGNS: /73    "Pulse 95   Temp 37.9 °C (100.3 °F)   Resp 18   Ht 1.702 m (5' 7\")   Wt 55.8 kg (123 lb 0.3 oz)   SpO2 98%   BMI 19.27 kg/m²    Constitutional: Nontoxic appearing young male.  Alert in no apparent distress.  HENT: Normocephalic, Atraumatic. Bilateral external ears normal. Nose normal.  Moist mucous membranes.  Oropharynx clear.  Eyes: Pupils are equal and reactive. Conjunctiva normal.   Neck: Supple, full range of motion  Heart: Regular rate and rhythm.  No murmurs.    Lungs: No respiratory distress, normal work of breathing. Lungs clear to auscultation bilaterally.  Abdomen Soft, no distention. There is diffuse, mild abdominal tenderness with voluntary guarding.   Musculoskeletal: Atraumatic. No obvious deformities noted.  No lower extremity edema.  Skin: Warm, Dry.  No erythema, No rash.   Neurologic: Alert and oriented x3. Moving all extremities spontaneously without focal deficits.  Psychiatric: Affect normal, Mood normal, Appears appropriate and not intoxicated.    DIAGNOSTIC STUDIES    EKG  Results for orders placed or performed during the hospital encounter of 21   EKG   Result Value Ref Range    Report       [facility] Emergency Dept.    Test Date:  2021  Pt Name:    TAYLOR SUTTON                 Department: ER  MRN:        [id number]                      Room:       [facility]  Gender:     Male                         Technician: AIR MEDIC STUDENT  :        1995                   Requested By:ER TRIAGE PROTOCOL  Order #:    [id number]                    Reading MD: Raisa Murrell MD    Measurements  Intervals                                Axis  Rate:       94                           P:          80  CO:         164                          QRS:        76  QRSD:       83                           T:          53  QT:         287  QTc:        359    Interpretive Statements  Sinus rhythm  No ectopy or hypertrophy  Normal axis and intervals  No ST or T wave change  Compared " to ECG 09/12/2020 22:02:01  No significant change from prior  Electronically Signed On 3- 17:12:33 PDT by Raisa Murrell MD         LABS  Personally reviewed by me  Labs Reviewed   CBC WITH DIFFERENTIAL - Abnormal; Notable for the following components:       Result Value    WBC 17.1 (*)     Hemoglobin 18.4 (*)     MCHC 36.7 (*)     Neutrophils-Polys 81.50 (*)     Lymphocytes 6.60 (*)     Neutrophils (Absolute) 13.93 (*)     Monos (Absolute) 1.89 (*)     All other components within normal limits   COMP METABOLIC PANEL - Abnormal; Notable for the following components:    Sodium 129 (*)     Potassium 3.0 (*)     Chloride 76 (*)     Anion Gap 23.0 (*)     Glucose 140 (*)     Bun 55 (*)     Creatinine 1.80 (*)     Calcium 10.8 (*)     Total Bilirubin 2.2 (*)     Albumin 6.0 (*)     Total Protein 9.5 (*)     All other components within normal limits   ESTIMATED GFR - Abnormal; Notable for the following components:    GFR If  56 (*)     GFR If Non  47 (*)     All other components within normal limits   COMP METABOLIC PANEL - Abnormal; Notable for the following components:    Potassium 3.1 (*)     Chloride 92 (*)     Glucose 129 (*)     Bun 48 (*)     Creatinine 1.41 (*)     All other components within normal limits   LIPASE   MAGNESIUM   ESTIMATED GFR       ED COURSE  Vitals:    03/16/21 1631 03/16/21 1701 03/16/21 1731 03/16/21 1801   BP: 117/66 111/61 113/59 124/75   Pulse: 88 84 98 76   Resp: 16 16 16 16   Temp:    37.1 °C (98.8 °F)   TempSrc:    Temporal   SpO2: 95% 93% 95% 94%   Weight:       Height:             Old records reviewed :  Patient was here in September 2020 for similar symptoms.  He had a significant leukocytosis of 23 at the time however imaging was normal.  Patient was treated symptomatically for likely cannabis hyperemesis syndrome      medications administered:  Medications   NS infusion 1,000 mL (0 mL Intravenous Stopped 3/16/21 1524)   haloperidol lactate  (HALDOL) injection 5 mg (5 mg Intravenous Given 3/16/21 1424)   NS infusion 1,000 mL (0 mL Intravenous Stopped 3/16/21 1650)   potassium chloride SA (Kdur) tablet 40 mEq (40 mEq Oral Given 3/16/21 1810)       Patient was given IV fluids for vomiting.  IV hydration was used because oral hydration was not adequate alone.  Following fluid administration patient's symptoms and hydration status were improved.    2:07 PM Patient seen and examined at bedside. The patient presents with nausea and vomiting. I explained to the patient that based off of his symptoms, I believe he has cannabis hyperemesis. However, we will undergo some diagnostic testing to rule out any abnormalities. . Ordered for EKG, CBC with diff, CMP, Lipase, and Magnesium to evaluate. Patient will be treated with NS infusion 1000 mL and Haldol 5 mg for his symptoms.     3:09 PM - Patient was reevaluated at bedside. Patient will be treated with another NS infusion 1000 mL and repeat labs following this.    MEDICAL DECISION MAKING  Patient who presents with 3-day history of vomiting and abdominal pain with history concerning for cannabis hyperemesis syndrome in the past.  He is afebrile with reassuring vital signs on arrival and nontoxic-appearing.  His abdominal exam demonstrates diffuse tenderness however nothing focal concerning for appendicitis, diverticulitis.  My suspicion for bowel obstruction or perforation is low.  He does have a significant leukocytosis however did have similar elevation when he was here with similar symptoms 6 months ago.  His imaging was normal at this time and I feel like his symptoms are consistent with cyclic vomiting from marijuana use therefore will not plan to reimage today.  His labs do show significant dehydration with acute kidney injury and acidosis as well as mild hypokalemia.  Plan for fluid resuscitation and symptomatic treatment followed by reassessment and repeat labs.    Labs following 2 L of fluid show  improvement of creatinine as well as complete improvement of acidosis.  Calcium was repleted here in the department.    5:30 PM - Upon reassessment, patient is resting comfortably with normal vital signs.  No new complaints at this time.  He reports feeling significantly improved and is tolerating water without difficulty.  Discussed results with patient and/or family as well as importance of primary care follow up.  We discussed repeating his kidney function in a few weeks to ensure its improved.  He understands importance of abstinence from marijuana.  Patient understands plan of care and strict return precautions for new or changing symptoms.     The patient will return for new or worsening symptoms and is stable at the time of discharge.    The patient is referred to a primary physician for blood pressure management, diabetic screening, and for all other preventative health concerns.    DISPOSITION:  Patient will be discharged home in stable condition.    FOLLOW UP:  [facility]  [address] [place] [id number] [phone]  Call   to establish primary care physician    [facility], Emergency Dept  [address] [facility] [address] [phone]    If symptoms worsen      OUTPATIENT MEDICATIONS:  Discharge Medication List as of 3/16/2021  6:12 PM      START taking these medications    Details   ondansetron (ZOFRAN ODT) 4 MG TABLET DISPERSIBLE Take 1 tablet by mouth every 6 hours as needed for Nausea., Disp-10 tablet, R-0, Print Rx Paper             IMPRESSION  (R11.2) Non-intractable vomiting with nausea, unspecified vomiting type  (E87.6) Hypokalemia  (N17.9) LAKESHA (acute kidney injury) (HCC)  (E86.0) Dehydration  (F12.10) Marijuana abuse     Shankar BELLO (Scribe), am scribing for, and in the presence of, Raisa Murrell M.D..    Electronically signed by: Shankar Rubio), 3/16/2021    Raisa BELLO M.D. personally performed the services  described in this documentation, as scribed by Shankar Perez in my presence, and it is both accurate and complete.    C    The note accurately reflects work and decisions made by me.  Raisa Murrell M.D.  3/16/2021  6:32 PM

## 2021-03-17 NOTE — DISCHARGE INSTRUCTIONS
You were seen in the Emergency Department for vomiting thought to be related to marijuana use.    Labs demonstrated dehydration which did improve here following fluids.  Kidney function was still minimally elevated and should likely be rechecked in a few weeks to ensure resolution.    Please use 1,000mg of tylenol or 600mg of ibuprofen every 6 hours as needed for pain.  Take nausea medication as directed.  Drink plenty of fluids.  Avoid further marijuana use.    Please follow up with your primary care physician.    Return to the Emergency Department with fevers, worsening pain, persistent vomiting, or other concerns.
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
## 2025-06-24 ENCOUNTER — APPOINTMENT (OUTPATIENT)
Dept: RADIOLOGY | Facility: IMAGING CENTER | Age: 30
End: 2025-06-24
Attending: PHYSICIAN ASSISTANT
Payer: MEDICAID

## 2025-06-24 ENCOUNTER — OFFICE VISIT (OUTPATIENT)
Dept: URGENT CARE | Facility: CLINIC | Age: 30
End: 2025-06-24
Payer: MEDICAID

## 2025-06-24 VITALS
DIASTOLIC BLOOD PRESSURE: 72 MMHG | SYSTOLIC BLOOD PRESSURE: 112 MMHG | OXYGEN SATURATION: 98 % | HEIGHT: 67 IN | WEIGHT: 135 LBS | BODY MASS INDEX: 21.19 KG/M2 | RESPIRATION RATE: 20 BRPM | HEART RATE: 71 BPM | TEMPERATURE: 97.7 F

## 2025-06-24 DIAGNOSIS — M79.672 LEFT FOOT PAIN: ICD-10-CM

## 2025-06-24 DIAGNOSIS — M79.672 LEFT FOOT PAIN: Primary | ICD-10-CM

## 2025-06-24 PROCEDURE — 99204 OFFICE O/P NEW MOD 45 MIN: CPT | Performed by: PHYSICIAN ASSISTANT

## 2025-06-24 PROCEDURE — 3074F SYST BP LT 130 MM HG: CPT | Performed by: PHYSICIAN ASSISTANT

## 2025-06-24 PROCEDURE — 3078F DIAST BP <80 MM HG: CPT | Performed by: PHYSICIAN ASSISTANT

## 2025-06-24 RX ORDER — IBUPROFEN 600 MG/1
600 TABLET, FILM COATED ORAL EVERY 8 HOURS PRN
Qty: 30 TABLET | Refills: 0 | Status: SHIPPED | OUTPATIENT
Start: 2025-06-24

## 2025-06-24 RX ORDER — METHYLPREDNISOLONE 4 MG/1
4 TABLET ORAL DAILY
Qty: 21 TABLET | Refills: 0 | Status: SHIPPED | OUTPATIENT
Start: 2025-06-24

## 2025-06-24 ASSESSMENT — ENCOUNTER SYMPTOMS
WEAKNESS: 0
TINGLING: 0

## 2025-06-24 NOTE — PROGRESS NOTES
"  Subjective:     Baldev Chicas  is a 30 y.o. male who presents for Foot Problem (Left foot pain, has had it for 6 month now , throbbing pain, numbness on left foot )       He presents today for acute worsening of chronic left foot pain.  Symptoms have been ongoing over the last 6 months but worsening in the last 3 days.  Describes the pain as being in the middle of the foot and worsen with standing and ambulating.  No definitive trauma or injury associated with symptom onset but patient does frequently BMX and has had several foot injuries from that activity.  No numbness/tingling over the foot.       Review of Systems   Musculoskeletal:  Positive for joint pain.   Neurological:  Negative for tingling and weakness.      Allergies[1]  Past Medical History[2]     Objective:   /72 (BP Location: Left arm, Patient Position: Sitting, BP Cuff Size: Adult)   Pulse 71   Temp 36.5 °C (97.7 °F) (Temporal)   Resp 20   Ht 1.702 m (5' 7\")   Wt 61.2 kg (135 lb)   SpO2 98%   BMI 21.14 kg/m²   Physical Exam  Vitals and nursing note reviewed.   Constitutional:       General: He is not in acute distress.     Appearance: He is not ill-appearing or toxic-appearing.   HENT:      Head: Normocephalic.      Nose: No rhinorrhea.   Eyes:      General: No scleral icterus.     Conjunctiva/sclera: Conjunctivae normal.   Pulmonary:      Effort: Pulmonary effort is normal. No respiratory distress.      Breath sounds: No stridor.   Musculoskeletal:      Cervical back: Neck supple.        Feet:    Feet:      Comments: Localized bony tenderness over the above marked region at the tarsal-metatarsal area.  Patient does have pain with toggling the tarsal bones.  Neurological:      Mental Status: He is alert and oriented to person, place, and time.   Psychiatric:         Mood and Affect: Mood normal.         Behavior: Behavior normal.         Thought Content: Thought content normal.         Judgment: Judgment normal.         "     Diagnostic testing:    Left foot x-ray series  Radiologist IMPRESSION:     No acute osseous abnormality.    Assessment/Plan:     Encounter Diagnoses   Name Primary?    Left foot pain Yes         Plan for care for today's complaint includes obtaining left foot x-ray series due to ongoing left-sided foot pain as well as negative for acute bony pathology.  Start patient on Medrol Dosepak and ibuprofen for acute exacerbation of chronic left foot pain.  Referral placed to podiatry due to symptom duration.  Activity modification discussed.  Vital signs were stable during today's office visit, patient was overall well-appearing. Continue to monitor symptoms and return to urgent care or follow-up with primary care provider if symptoms remain ongoing.  Follow-up in the emergency department if symptoms become severe, ER precautions discussed in office today.  Prescription for Medrol Dosepak, ibuprofen provided.    See AVS Instructions below for written guidance provided to patient on after-visit management and care in addition to our verbal discussion during the visit.    Please note that this dictation was created using voice recognition software. I have attempted to correct all errors, but there may be sound-alike, spelling, grammar and possibly content errors that I did not discover before finalizing the note.    Wilner Johnson PA-C       [1] No Known Allergies  [2] History reviewed. No pertinent past medical history.